# Patient Record
Sex: FEMALE | Race: WHITE | NOT HISPANIC OR LATINO | Employment: FULL TIME | URBAN - METROPOLITAN AREA
[De-identification: names, ages, dates, MRNs, and addresses within clinical notes are randomized per-mention and may not be internally consistent; named-entity substitution may affect disease eponyms.]

---

## 2022-04-12 ENCOUNTER — OFFICE VISIT (OUTPATIENT)
Dept: BARIATRICS | Facility: CLINIC | Age: 47
End: 2022-04-12

## 2022-04-12 VITALS
WEIGHT: 248.7 LBS | DIASTOLIC BLOOD PRESSURE: 70 MMHG | OXYGEN SATURATION: 98 % | HEIGHT: 60 IN | TEMPERATURE: 97.8 F | BODY MASS INDEX: 48.83 KG/M2 | HEART RATE: 76 BPM | SYSTOLIC BLOOD PRESSURE: 130 MMHG

## 2022-04-12 DIAGNOSIS — E78.5 HYPERLIPEMIA: ICD-10-CM

## 2022-04-12 DIAGNOSIS — E66.01 OBESITY, CLASS III, BMI 40-49.9 (MORBID OBESITY) (HCC): ICD-10-CM

## 2022-04-12 DIAGNOSIS — D64.9 ANEMIA: ICD-10-CM

## 2022-04-12 DIAGNOSIS — I10 ESSENTIAL HYPERTENSION: ICD-10-CM

## 2022-04-12 DIAGNOSIS — E66.01 MORBID OBESITY (HCC): Primary | ICD-10-CM

## 2022-04-12 DIAGNOSIS — Z01.818 PRE-OPERATIVE CLEARANCE: Primary | ICD-10-CM

## 2022-04-12 DIAGNOSIS — E66.9 DIABETES MELLITUS TYPE 2 IN OBESE (HCC): ICD-10-CM

## 2022-04-12 DIAGNOSIS — E11.69 DIABETES MELLITUS TYPE 2 IN OBESE (HCC): ICD-10-CM

## 2022-04-12 PROCEDURE — RECHECK

## 2022-04-12 RX ORDER — BUDESONIDE 90 UG/1
1 AEROSOL, POWDER RESPIRATORY (INHALATION) 2 TIMES DAILY
COMMUNITY

## 2022-04-12 RX ORDER — RIZATRIPTAN BENZOATE 5 MG/1
5 TABLET ORAL ONCE AS NEEDED
COMMUNITY

## 2022-04-12 RX ORDER — LISINOPRIL 20 MG/1
1 TABLET ORAL DAILY
COMMUNITY
Start: 2021-12-13

## 2022-04-12 RX ORDER — TOPIRAMATE 25 MG/1
TABLET ORAL
COMMUNITY
Start: 2021-11-30

## 2022-04-12 RX ORDER — FENOFIBRATE 134 MG/1
134 CAPSULE ORAL
COMMUNITY
Start: 2021-05-12 | End: 2022-06-24

## 2022-04-12 RX ORDER — ALBUTEROL SULFATE 90 UG/1
AEROSOL, METERED RESPIRATORY (INHALATION)
COMMUNITY
Start: 2022-03-01

## 2022-04-12 RX ORDER — HYDROCHLOROTHIAZIDE 25 MG/1
1 TABLET ORAL DAILY
COMMUNITY
Start: 2022-03-11

## 2022-04-12 RX ORDER — OMEPRAZOLE 20 MG/1
20 TABLET, DELAYED RELEASE ORAL DAILY
COMMUNITY
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
## 2022-04-12 NOTE — PROGRESS NOTES
Bariatric Nutrition Assessment Note    Type of surgery    Preop  Surgery Date: TBD- 6 month program requirement     Surgeon: Dr Kwabena Garcia  51 y o   female     Wt with BMI of 25: 127 3#  Pre-Op Excess Wt: 121 4#  Ht 5' (1 524 m)   Wt 113 kg (248 lb 11 2 oz)   BMI 48 57 kg/m²     [place]- [facility]  Jeor Equation:  1685 kcal per day   Estimated calories for weight loss 0628-8524 kcal per day  ( 1-2# per wk wt loss - sedentary )  Estimated protein needs 58-69 grams per day (1 0-1 2 gms/kg IBW )   Estimated fluid needs 68 ounces (35 ml/kg IBW )      Weight History   Onset of Obesity: Adult- after birth of her daughter 29 years ago   Family history of obesity: Yes  Wt Loss Attempts: Counseling with  MD  High Protein/Low CHO diets (Atkins, Union, etc )  Medication prescribed for wt loss - topamax,, and intermittent fasting   Dietitian for wt loss and diabetes   Patient has tried the above for 6 months or more with insufficient weight loss or weight regain, which is why patient has requested to be evaluated for weight loss surgery today  Maximum Wt Lost: 20# with medication and high protein       Review of History and Medications   No past medical history on file  No past surgical history on file    Social History     Socioeconomic History    Marital status:      Spouse name: Not on file    Number of children: Not on file    Years of education: Not on file    Highest education level: Not on file   Occupational History    Not on file   Tobacco Use    Smoking status: Not on file    Smokeless tobacco: Not on file   Substance and Sexual Activity    Alcohol use: Not on file    Drug use: Not on file    Sexual activity: Not on file   Other Topics Concern    Not on file   Social History Narrative    Not on file     Social Determinants of Health     Financial Resource Strain: Not on file   Food Insecurity: Not on file   Transportation Needs: Not on file   Physical Activity: Not on file   Stress: Not on file   Social Connections: Not on file   Intimate Partner Violence: Not on file   Housing Stability: Not on file     No current outpatient medications on file  Food Intake and Lifestyle Assessment   Food Intake Assessment completed via usual diet recall  Works from home 8 to 4 pm   Breakfast: 10 to 12 noon   Either home made turkey dover egg and bagel   OR- hummus and falafel and baba ganeshe with dover and egg   Snack: 0   Lunch: 0  Snack: low fat cheese stick or pre cut cheese   Dinner: 5 to 6 pm - cooks dinner for her father who she cares for   Generally will make pasta with meatsauce or frozen beef meatballs  Tacos, pasta fagioli ( vegetarian ) , chicken sausage ( italian with zuccini broccli advocado oil )  Thursday night take out - pizza, Javier or [employer]: 7:30 pm - cheetos, or doritos or pistachios or popcorn ( blue box microwave )   Beverage intake: water, sugar free beverages and turkey [place] diet iced tea, powerade or gatorade zero   32 ounces Jose insulated mugs with 6 ice cubes   Occasional diet soda   Protein supplement: none currently - dislikes anything "milky" in texture   Estimated protein intake per day: 40-50 grams   Estimated fluid intake per day: > 64 ounces of fluid per day   Meals eaten away from home: once per week on Thursdays   Typical meal pattern: 2 meals per day and 2-3 snacks per day  Eating Behaviors: Frequent snacking/ grazing, Mindless eating and Craves salty foods  Food allergies or intolerances: Not on File   Allergic to bananas and jason seeds   Cultural or Yazidi considerations: , avoids pork ,     Physical Assessment  Physical Activity  Types of exercise:  Yoga  Stopped during pandemic   Modified yoga at home   Current physical limitations: osteoarthritis in both knees but had to stop hiking due to accident       Psychosocial Assessment   Support systems: friend(s)  Socioeconomic factors: lives with  2 sons - 12 and 25, and caretaker for father,   Works full time from home Adult daughter living outside of the home     Nutrition Diagnosis  Diagnosis: Overweight / Obesity (NC-3 3)  Related to: Physical inactivity and Excessive energy intake  As Evidenced by: BMI >25 and Unintentional weight gain     Nutrition Prescription: Recommend the following diet  1500 kcal per day/ 75 grams protein/ 170 grams carb/ 58 grams of fat and 15 grams of fiber   64-70 ounces of calorie free beverages per day     Interventions and Teaching   Discussed pre-op and post-op nutrition guidelines  Patient educated and handouts provided    Surgical changes to stomach / GI  Capacity of post-surgery stomach  Diet progression  Adequate hydration  Sugar and fat restriction to decrease "dumping syndrome"  Fat restriction to decrease steatorrhea  Expected weight loss  Weight loss plateaus/ possibility of weight regain  Exercise  Suggestions for pre-op diet  Nutrition considerations after surgery  Protein supplements  Appropriate carbohydrate, protein, and fat intake, and food/fluid choices to maximize safe weight loss, nutrient intake, and tolerance   Dietary and lifestyle changes  Possible problems with poor eating habits  Intuitive eating  Techniques for self monitoring and keeping daily food journal  Potential for food intolerance after surgery, and ways to deal with them including: lactose intolerance, nausea, reflux, vomiting, diarrhea, food intolerance, appetite changes, gas  Vitamin / Mineral supplementation of Multivitamin with minerals, Calcium, Vitamin B12, Iron, Fat Soluble vitamins and Vitamin D    Patient is not currently pregnant and doesn't desire to become pregnant a minimum of one year post-op    Education provided to: patient    Barriers to learning: No barriers identified    Readiness to change: preparation    Prior research on procedure: pre-op class and friends or family    Comprehension: needs reinforcement, verbalizes understanding and due to long term Covid side effects, has some short term memory issues      Expected Compliance: good  Recommendations  Pt is an appropriate candidate for surgery   Yes    Evaluation / Monitoring  Dietitian to Monitor: Eating pattern as discussed Tolerance of nutrition prescription Body weight Lab values Physical activity Bowel pattern    Goals  Complete lession plans 1-6, Eat 3 meals per day and Eliminate mindless snacking   Recommend 2000 IU D3 per day, multivitamin and mineral and iron per day   Meal plan  B- 10 am   S- 2-3 pm - cheese stick  D- 5 pm - protein, veggies and 1/2 cup starch  S- planned snack- handout provided   Continue with yoga        Time Spent:   1 Hour

## 2022-05-20 ENCOUNTER — OFFICE VISIT (OUTPATIENT)
Dept: BARIATRICS | Facility: CLINIC | Age: 47
End: 2022-05-20

## 2022-05-20 VITALS — WEIGHT: 249.4 LBS | BODY MASS INDEX: 48.97 KG/M2 | HEIGHT: 60 IN

## 2022-05-20 DIAGNOSIS — E66.01 OBESITY, CLASS III, BMI 40-49.9 (MORBID OBESITY) (HCC): Primary | ICD-10-CM

## 2022-05-20 PROCEDURE — RECHECK: Performed by: DIETITIAN, REGISTERED

## 2022-05-20 NOTE — PATIENT INSTRUCTIONS
Goals  Attend one support group  Complete lesson plan 2 - keep food log, read food labels  Continue to eat off a smaller plate 8-9 inches  Modified yoga daily   Eat 3 meals and one planned snack per day
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
## 2022-06-24 ENCOUNTER — OFFICE VISIT (OUTPATIENT)
Dept: BARIATRICS | Facility: CLINIC | Age: 47
End: 2022-06-24
Payer: COMMERCIAL

## 2022-06-24 VITALS
HEIGHT: 60 IN | HEART RATE: 87 BPM | WEIGHT: 243.5 LBS | BODY MASS INDEX: 47.81 KG/M2 | DIASTOLIC BLOOD PRESSURE: 78 MMHG | TEMPERATURE: 99.1 F | SYSTOLIC BLOOD PRESSURE: 118 MMHG

## 2022-06-24 DIAGNOSIS — G89.29 CHRONIC PAIN OF BOTH KNEES: ICD-10-CM

## 2022-06-24 DIAGNOSIS — E78.2 MIXED HYPERLIPIDEMIA: ICD-10-CM

## 2022-06-24 DIAGNOSIS — I10 ESSENTIAL HYPERTENSION: ICD-10-CM

## 2022-06-24 DIAGNOSIS — G43.009 MIGRAINE WITHOUT AURA AND WITHOUT STATUS MIGRAINOSUS, NOT INTRACTABLE: ICD-10-CM

## 2022-06-24 DIAGNOSIS — M17.0 OSTEOARTHRITIS OF BOTH KNEES: ICD-10-CM

## 2022-06-24 DIAGNOSIS — E11.9 TYPE 2 DIABETES MELLITUS WITHOUT COMPLICATION, WITHOUT LONG-TERM CURRENT USE OF INSULIN (HCC): ICD-10-CM

## 2022-06-24 DIAGNOSIS — M25.562 CHRONIC PAIN OF BOTH KNEES: ICD-10-CM

## 2022-06-24 DIAGNOSIS — M25.561 CHRONIC PAIN OF BOTH KNEES: ICD-10-CM

## 2022-06-24 DIAGNOSIS — E66.01 OBESITY, CLASS III, BMI 40-49.9 (MORBID OBESITY) (HCC): Primary | ICD-10-CM

## 2022-06-24 PROBLEM — M19.90 ARTHRITIS: Status: ACTIVE | Noted: 2019-08-06

## 2022-06-24 PROBLEM — E66.813 OBESITY, CLASS III, BMI 40-49.9 (MORBID OBESITY): Status: ACTIVE | Noted: 2022-06-24

## 2022-06-24 PROCEDURE — 99204 OFFICE O/P NEW MOD 45 MIN: CPT | Performed by: SURGERY

## 2022-06-24 RX ORDER — METFORMIN HYDROCHLORIDE 500 MG/1
500 TABLET, EXTENDED RELEASE ORAL
COMMUNITY
Start: 2022-05-04

## 2022-06-24 NOTE — PROGRESS NOTES
BARIATRIC INITIAL CONSULT - BARIATRIC SURGERY    Lance Martinez 49 y o  female MRN: [id number]  Unit/Bed#:  Encounter: [id number]      HPI:  Lance Martinez is a 49 y o  female who presents with a longstanding history of morbid obesity and inability to sustain a meaningful weight loss  Here today to discuss bariatric options  Visit type: initial visit    Symptoms: excess weight and inability to loss weight    Associated Symptoms: depressed mood and anxiety    Associated Conditions: hyperlipidemia, abdominal obesity and hypergycemia  Disease Complications: diabetes, hypertension, osteoarthritis and Migraine  Weight Loss Interest: high  Previous Diet Trials: low calorie     Exercise Frequency:infrequency  Types of Exercise: walking        Review of Systems   Gastrointestinal:        Intermittent heartburn   All other systems reviewed and are negative  Historical Information   Past Medical History:   Diagnosis Date    Diabetes mellitus ([facility] [address] )     Hypercholesteremia     Hypertension     Migraines      History reviewed  No pertinent surgical history  Social History   Social History     Substance and Sexual Activity   Alcohol Use Yes    Comment: socially/rare     Social History     Substance and Sexual Activity   Drug Use Never     Social History     Tobacco Use   Smoking Status Never Smoker   Smokeless Tobacco Never Used     Family History: non-contributory    Meds/Allergies   all medications and allergies reviewed  Allergies   Allergen Reactions    Simvastatin Other (See Comments)     Other reaction(s): MUSCLE SPASMS    Latex Rash       Objective       Current Vitals:   Blood Pressure: 118/78 (06/24/22 1431)  Pulse: 87 (06/24/22 1431)  Temperature: 99 1 °F (37 3 °C) (06/24/22 1431)  Temp Source: Tympanic (06/24/22 1431)  Height: 5' (152 4 cm) (06/24/22 1431)  Weight - Scale: 110 kg (243 lb 8 oz) (06/24/22 1431)        Invasive Devices  Report    None                 Physical Exam  Vitals reviewed  Constitutional:       General: She is not in acute distress  Appearance: She is well-developed  She is not diaphoretic  HENT:      Head: Normocephalic and atraumatic  Right Ear: External ear normal       Left Ear: External ear normal       Nose: Nose normal    Eyes:      General: No scleral icterus  Right eye: No discharge  Left eye: No discharge  Conjunctiva/sclera: Conjunctivae normal    Neurological:      Mental Status: She is alert and oriented to person, place, and time  Psychiatric:         Behavior: Behavior normal          Thought Content: Thought content normal          Judgment: Judgment normal          Lab Results: I have personally reviewed pertinent lab results  Imaging: I have personally reviewed pertinent reports  EKG, Pathology, and Other Studies: I have personally reviewed pertinent reports  Assessment/PLAN:    49 y o  female with a long standing h/o of obesity and inability to sustain any meaningful weight loss on her own despite several attempts  She is interested in the Laparoscopic sleeve gastrectomy  We have discussed the 14%-20% incidence of post op heartburn after the sleeve gastrectomy and the fact that if this cannot be controlled with medication or conservative measures it might need to be converted to a Gisselle-en-Y gastric bypass  We have also discussed the fact that the patient needs to be followed up postoperatively and potentially get screening endoscopies in the future to rule out any development of pathology as a result of the sleeve gastrectomy  As a part of her pre op evaluation, she will be referred to a cardiologist and for a sleep evaluation and consult  She needs an EGD to evaluate the anatomy of her GI tract prior to the operation  I have spent over 30 minutes with her face to face in the office today discussing her options and details of the surgery   We have seen an animation of the surgery on the computer that illustrates how the operation is done and how the anatomy will be altered with the procedure  Over 50% of this was coordinating care  I have used the [facility] bariatric surgical risk/benefit calculator and we have discussed the results as part of the preop education  She was given the opportunity to ask questions and I have answered all of them  I have discussed and educated the patient with regards to the components of our multidisciplinary program and the importance of compliance and follow up in the post operative period  The patient was also instructed with regards to the importance of behavior modification, nutritional counseling, support meeting attendance and lifestyle changes that are important to ensure success  Although there is a great statistical chance of improvement or even resolution of most of her associated comorbidities, the results vary from patient to patient and they largely depend on her commitment and compliance  She needs to lose  12 lbs prior to the operation        Nicci Looney MD  6/24/2022  2:41 PM

## 2022-07-21 ENCOUNTER — OFFICE VISIT (OUTPATIENT)
Dept: BARIATRICS | Facility: CLINIC | Age: 47
End: 2022-07-21

## 2022-07-21 DIAGNOSIS — E66.01 OBESITY, CLASS III, BMI 40-49.9 (MORBID OBESITY) (HCC): Primary | ICD-10-CM

## 2022-07-21 PROCEDURE — RECHECK

## 2022-08-17 ENCOUNTER — OFFICE VISIT (OUTPATIENT)
Dept: BARIATRICS | Facility: CLINIC | Age: 47
End: 2022-08-17

## 2022-08-17 VITALS — BODY MASS INDEX: 47.91 KG/M2 | HEIGHT: 60 IN | WEIGHT: 244 LBS

## 2022-08-17 DIAGNOSIS — E66.01 OBESITY, CLASS III, BMI 40-49.9 (MORBID OBESITY) (HCC): Primary | ICD-10-CM

## 2022-08-17 PROCEDURE — RECHECK: Performed by: DIETITIAN, REGISTERED

## 2022-08-17 NOTE — PROGRESS NOTES
Bariatric Nutrition Assessment Note    Type of surgery    Preop  Surgery Date: TBD- 6 month program requirement     Today is 4/6 of program requirement     Surgeon: Dr Doug Santoyo  49 y o   female     Wt with BMI of 25: 127 3#  Pre-Op Excess Wt: 121 4#  Ht 5' (1 524 m)   Wt 111 kg (244 lb)   BMI 47 65 kg/m²   Pt has gained two pounds since last appointment  Wt Readings from Last 3 Encounters:   06/24/22 110 kg (243 lb 8 oz)   05/20/22 113 kg (249 lb 6 4 oz)   04/12/22 113 kg (248 lb 11 2 oz)       [place] Equation:  1685 kcal per day   Estimated calories for weight loss 0399-3747 kcal per day  ( 1-2# per wk wt loss - sedentary )  Estimated protein needs 58-69 grams per day (1 0-1 2 gms/kg IBW )   Estimated fluid needs 68 ounces (35 ml/kg IBW )      Weight History   Onset of Obesity: Adult- after birth of her daughter 29 years ago   Family history of obesity: Yes  Wt Loss Attempts: Counseling with  MD  High Protein/Low CHO diets (Atkins, [place], etc )  Medication prescribed for wt loss - topamax,, and intermittent fasting   Dietitian for wt loss and diabetes   Patient has tried the above for 6 months or more with insufficient weight loss or weight regain, which is why patient has requested to be evaluated for weight loss surgery today  Maximum Wt Lost: 20# with medication and high protein       Review of History and Medications   Past Medical History:   Diagnosis Date    [facility] mellitus ([facility] [address] )     Hypercholesteremia     Hypertension     Migraines      No past surgical history on file    Social History     Socioeconomic History    Marital status:      Spouse name: Not on file    Number of children: Not on file    Years of education: Not on file    Highest education level: Not on file   Occupational History    Not on file   Tobacco Use    Smoking status: Never Smoker    Smokeless tobacco: Never Used   Vaping Use    Vaping Use: Never used Substance and Sexual Activity    Alcohol use: Yes     Comment: socially/rare    Drug use: Never    Sexual activity: Not on file   Other Topics Concern    Not on file   Social History Narrative    Not on file     Social Determinants of Health     Financial Resource Strain: Not on file   Food Insecurity: Not on file   Transportation Needs: Not on file   Physical Activity: Not on file   Stress: Not on file   Social Connections: Not on file   Intimate Partner Violence: Not on file   Housing Stability: Not on file       Current Outpatient Medications:     albuterol (PROVENTIL HFA,VENTOLIN HFA) 90 mcg/act inhaler, TAKE 2 PUFFS BY MOUTH EVERY 6 HOURS AS NEEDED FOR WHEEZE, Disp: , Rfl:     budesonide (Pulmicort Flexhaler) 90 MCG/ACT inhaler, Inhale 1 puff 2 (two) times a day Rinse mouth after use , Disp: , Rfl:     fenofibrate micronized (LOFIBRA) 134 MG capsule, Take 134 mg by mouth, Disp: , Rfl:     hydrochlorothiazide (HYDRODIURIL) 25 mg tablet, Take 1 tablet by mouth daily, Disp: , Rfl:     lisinopril (ZESTRIL) 20 mg tablet, Take 1 tablet by mouth daily, Disp: , Rfl:     metFORMIN (GLUCOPHAGE) 500 mg tablet, Take 500 mg by mouth in the morning, Disp: , Rfl:     metFORMIN (GLUCOPHAGE-XR) 500 mg 24 hr tablet, Take 500 mg by mouth daily with breakfast, Disp: , Rfl:     Multiple Vitamin (MULTIVITAMIN ADULT PO), Take 1 tablet by mouth daily, Disp: , Rfl:     omeprazole (PriLOSEC OTC) 20 MG tablet, Take 20 mg by mouth daily, Disp: , Rfl:     rizatriptan (MAXALT) 5 MG tablet, Take 5 mg by mouth once as needed for migraine May repeat in 2 hours if needed, Disp: , Rfl:     topiramate (TOPAMAX) 25 mg tablet, Take one to three pills by mouth in the evening as directed w plenty of water, Disp: , Rfl:      Food Intake and Lifestyle Assessment   Food Intake Assessment completed via usual diet recall  Works from home 8 to 4 pm   Breakfast: 10 to 12 noon   Either home made turkey dover egg and bagel   OR- hummus and falafel and baba ganeshe with dover and egg   Snack: 0   Lunch: 0  Snack: low fat cheese stick or pre cut cheese   Dinner: 5 to 6 pm - cooks dinner for her father who she cares for   Generally will make pasta with meatsauce or frozen beef meatballs  Tacos, pasta fagioli ( vegetarian ) , chicken sausage ( italian with zuccini broccli advocado oil )  Thursday night take out - pizza, Javier or [employer]: 7:30 pm - cheetos, or doritos or pistachios or popcorn ( blue box microwave )   Beverage intake: water, sugar free beverages and turkey [place] diet iced tea, powerade or gatorade zero   32 ounces Jose insulated mugs with 6 ice cubes   Occasional diet soda   Protein supplement: none currently - dislikes anything "milky" in texture   Estimated protein intake per day: 40-50 grams   Estimated fluid intake per day: > 64 ounces of fluid per day   Meals eaten away from home: once per week on Thursdays   Typical meal pattern: 2 meals per day and 2-3 snacks per day  Eating Behaviors: Frequent snacking/ grazing, Mindless eating and Craves salty foods  Food allergies or intolerances: Allergies   Allergen Reactions    Simvastatin Other (See Comments)     Other reaction(s): MUSCLE SPASMS    Latex Rash      Allergic to bananas and jason seeds   Cultural or Latter day considerations: , avoids pork ,     Physical Assessment  Physical Activity  Types of exercise:  Yoga and swimming  Stopped during pandemic   Modified yoga at home   Current physical limitations: osteoarthritis in both knees but had to stop hiking due to accident       Psychosocial Assessment   Support systems: friend(s)  Socioeconomic factors: lives with  2 sons - 12 and 25, and caretaker for father,   Works full time from home Adult daughter living outside of the home     Nutrition Diagnosis- continued   Diagnosis: Overweight / Obesity (NC-3 3)  Related to: Physical inactivity and Excessive energy intake  As Evidenced by: BMI >25 and Unintentional weight gain     Nutrition Prescription: Recommend the following diet  1500 kcal per day/ 75 grams protein/ 170 grams carb/ 58 grams of fat and 15 grams of fiber   64-70 ounces of calorie free beverages per day     Interventions and Teaching   Discussed pre-op and post-op nutrition guidelines  Patient educated and handouts provided    Surgical changes to stomach / GI  Capacity of post-surgery stomach  Diet progression  Adequate hydration  Sugar and fat restriction to decrease "dumping syndrome"  Fat restriction to decrease steatorrhea  Expected weight loss  Weight loss plateaus/ possibility of weight regain  Exercise  Suggestions for pre-op diet  Nutrition considerations after surgery  Protein supplements  Appropriate carbohydrate, protein, and fat intake, and food/fluid choices to maximize safe weight loss, nutrient intake, and tolerance   Dietary and lifestyle changes  Possible problems with poor eating habits  Intuitive eating  Techniques for self monitoring and keeping daily food journal  Potential for food intolerance after surgery, and ways to deal with them including: lactose intolerance, nausea, reflux, vomiting, diarrhea, food intolerance, appetite changes, gas  Vitamin / Mineral supplementation of Multivitamin with minerals, Calcium, Vitamin B12, Iron, Fat Soluble vitamins and Vitamin D    Patient is not currently pregnant and doesn't desire to become pregnant a minimum of one year post-op    Education provided to: patient    Barriers to learning: No barriers identified    Readiness to change: preparation    Prior research on procedure: pre-op class and friends or family    Comprehension: needs reinforcement, verbalizes understanding and due to long term Covid side effects, has some short term memory issues      Expected Compliance: good    Workflow:   Psych and/or D+A Clearance: N/A   PCP Letter: [facility]- won't write letter until month before surgery    Support Group: no   Surgeon Appt June 24th 2022   EGD needs surgeon appointment    Cardiac Risk Assessment referral placed    Sleep Studies N/A   Blood work completed by PCP    Nicotine test N/A   6 Month Pre-Operative Program: 1/6   Weight Loss goal 236#      Recommendations  Pt is an appropriate candidate for surgery  Yes    Evaluation / Monitoring  Pt forgot book and expressed that she learned the material from another program, so does not need to do it again  Pt is eating two meals a day with one snack on some days  Pt gets physical activity most days through modified yoga, going to the pool, or walking  Pt went to a support group in June, and seemed to like it/made a recipe from it  Goals- continued   Complete lession plans 1-6, Eat 3 meals per day and Eliminate mindless snacking  Meal plan  B- 10 am   S- 1-2 pm - cheese stick  D- 6 pm - protein, veggies and 1/2 cup starch  S- planned snack- handout provided - skips when she eats dinner later   Increase activity by continuing completing two youtube workout videos per week  Improve carbohydrate quality by increasing whole grains and substituting bean/vegetable noodles for white        Time Spent:   30 minutes

## 2022-08-17 NOTE — PATIENT INSTRUCTIONS
Goals  Decrease processed carbohydrates  Increase vegetables    Continue with 2 meals and one snack   Try 2 you tube videos per week or one video and one walk

## 2022-08-30 ENCOUNTER — PREP FOR PROCEDURE (OUTPATIENT)
Dept: BARIATRICS | Facility: CLINIC | Age: 47
End: 2022-08-30

## 2022-08-30 DIAGNOSIS — E66.01 OBESITY, CLASS III, BMI 40-49.9 (MORBID OBESITY) (HCC): Primary | ICD-10-CM

## 2022-09-14 NOTE — PROGRESS NOTES
Name           Niles Harding      5/6 weight check  Starting weight 248 7  Last time weight 244  Today's weight 239 2      Surgery month:  Nov/Dec  Surgery deadline: March  Surgeon: Dr Ayaka Dan Follow Up Note:    Preop  Surgery Date: TBD- 6 month program requirement     Mental health and wellness - Patient presents to the office today  For support visit and weight check  The patient explained she did the MWM with [facility] and is able to remember how much food intake to consume, but will be buying the measuring cups  Continues to read the bariatric book because she feels it is very insightful and would be trying to link with FB because she would like to increase her support network  Eating behaviors - two meals a day  Sometimes snacks last meal at 6:00 pm  working on 30/60 and not journaling or tracking, but shared she is using a smaller plate, a teaspoon and not having seconds  Activity -  Chair yoga daily  Has some difficulty walking because of pain,  but she is swimming this weekend two days  Progress toward program requirements    Workflow:  Psych and/or D+A Clearance: N/A  PCP Letter:Pt shared that she was going to talk to her PCP to explain its not to clear her  Support Group: 6/8/22  Surgeon Appt  June 24 th 2022  EGD appointment scheduled for 10  5 22   Cardiac Risk Assessment referral placed pt shared scheduled 10 3  22  Sleep Studies N/A  Blood work completed by PCP   Nicotine test N/A     Reviewed and discussed  Adequate hydration  Exercise  Meal planning and preparation  Lifestyle changes  Techniques for self monitoring and keeping daily food journal    Goal:1- getting back to walking and enrique  Next appointment: 10/13/22 JOSÉ MIGUEL

## 2022-09-15 ENCOUNTER — OFFICE VISIT (OUTPATIENT)
Dept: BARIATRICS | Facility: CLINIC | Age: 47
End: 2022-09-15

## 2022-09-15 VITALS — BODY MASS INDEX: 46.72 KG/M2 | WEIGHT: 239.2 LBS

## 2022-09-15 DIAGNOSIS — E66.01 OBESITY, CLASS III, BMI 40-49.9 (MORBID OBESITY) (HCC): Primary | ICD-10-CM

## 2022-09-15 PROCEDURE — RECHECK

## 2022-09-26 ENCOUNTER — TELEPHONE (OUTPATIENT)
Dept: BARIATRICS | Facility: CLINIC | Age: 47
End: 2022-09-26

## 2022-09-26 NOTE — TELEPHONE ENCOUNTER
Called and confirmed EGD scheduled Oct 5 Problem: OXYGENATION/RESPIRATORY FUNCTION  Goal: Patient will maintain patent airway  5/18/2021 2220 by Saad Gongora RN  Outcome: Ongoing  Note:   Patient's EF (Ejection Fraction) is less than 40%    Heart Failure Medications:  Diuretics[de-identified] Furosemide    (One of the following REQUIRED for EF <40%/SYSTOLIC FAILURE but MAY be used in EF% >40%/DIASTOLIC FAILURE)        ACE[de-identified] Lisinopril        ARB[de-identified] None         ARNI[de-identified] None    (Beta Blockers)  NON- Evidenced Based Beta Blocker (for EF% >40%/DIASTOLIC FAILURE): Metoprolol TARTrate- Lopressor    Evidenced Based Beta Blocker::(REQUIRED for EF% <40%/SYSTOLIC FAILURE) None  . .................................................................................................................................................. Patient's weights and intake/output reviewed: Yes    Patient's Last Weight: 259 lbs obtained by standing scale. Difference of 6 lbs less than last documented weight. Intake/Output Summary (Last 24 hours) at 5/18/2021 2220  Last data filed at 5/18/2021 2053  Gross per 24 hour   Intake 1210 ml   Output 3425 ml   Net -2215 ml       Comorbidities Reviewed Yes    Patient has a past medical history of Diverticulitis, Hypertension, and Kidney stone. >>For CHF and Comorbidity documentation on Education Time and Topics, please see Education Tab    Progressive Mobility Assessment:  What is this patient's Current Level of Mobility?: Ambulatory-Up Ad Aminta  How was this patient Mobilized today?: Edge of Bed, Up to Chair, Bedside Commode,  Up to Toilet/Shower, and Up in Room                 With Whom? Self                 Level of Difficulty/Assistance: Independent     Pt resting in bed at this time on room air. Pt denies shortness of breath. Pt with pitting lower extremity edema.      Patient and/or Family's stated Goal of Care this Admission: reduce shortness of breath, increase activity tolerance, better understand heart failure and disease management, be more

## 2022-10-03 ENCOUNTER — OFFICE VISIT (OUTPATIENT)
Dept: CARDIOLOGY CLINIC | Facility: CLINIC | Age: 47
End: 2022-10-03
Payer: COMMERCIAL

## 2022-10-03 VITALS
OXYGEN SATURATION: 98 % | HEIGHT: 60 IN | DIASTOLIC BLOOD PRESSURE: 86 MMHG | HEART RATE: 79 BPM | BODY MASS INDEX: 48.18 KG/M2 | WEIGHT: 245.4 LBS | SYSTOLIC BLOOD PRESSURE: 122 MMHG

## 2022-10-03 DIAGNOSIS — E66.01 MORBID OBESITY (HCC): ICD-10-CM

## 2022-10-03 DIAGNOSIS — Z01.810 PRE-OPERATIVE CARDIOVASCULAR EXAMINATION: ICD-10-CM

## 2022-10-03 DIAGNOSIS — E78.2 MIXED HYPERLIPIDEMIA: ICD-10-CM

## 2022-10-03 DIAGNOSIS — I10 ESSENTIAL HYPERTENSION: ICD-10-CM

## 2022-10-03 DIAGNOSIS — E11.9 TYPE 2 DIABETES MELLITUS WITHOUT COMPLICATION, WITHOUT LONG-TERM CURRENT USE OF INSULIN (HCC): ICD-10-CM

## 2022-10-03 DIAGNOSIS — E66.01 OBESITY, CLASS III, BMI 40-49.9 (MORBID OBESITY) (HCC): Primary | ICD-10-CM

## 2022-10-03 PROCEDURE — 99204 OFFICE O/P NEW MOD 45 MIN: CPT | Performed by: INTERNAL MEDICINE

## 2022-10-03 PROCEDURE — 93000 ELECTROCARDIOGRAM COMPLETE: CPT | Performed by: INTERNAL MEDICINE

## 2022-10-03 RX ORDER — SODIUM CHLORIDE 9 MG/ML
125 INJECTION, SOLUTION INTRAVENOUS CONTINUOUS
Status: CANCELLED | OUTPATIENT
Start: 2022-10-03

## 2022-10-03 RX ORDER — FENOFIBRATE 200 MG/1
200 CAPSULE ORAL DAILY
COMMUNITY
Start: 2022-08-02

## 2022-10-03 NOTE — PROGRESS NOTES
[facility]    CHIEF COMPLAINT:   Chief Complaint   Patient presents with    New Patient Visit     Cardiac Clearance for bariatric surgery (no set date yet)- No previous Cardiologist        HPI:  Alejandro Singh is a 49 y o  female with a past medical history of morbid obesity, hypertension, hyperlipidemia, diabetes without insulin use who presents today for per operative risk assessment for bariatric surgery  She is following with the weight management center  Her surgery will tentatively be this December/January  She denies any history of TIA/CVA, MI, CHF  Her renal function is normal  Her diabetes is treated with metformin  She has tried simvastatin in the past and had severe myalgias  No history of sleep apnea  She denies any fatigue, new visual changes, lightheadedness, syncope, chest pain, palpitations, shortness of breath at rest or with exertion, orthopnea, PND, nausea, vomiting, diarrhea, dark or bright red blood in stools, lower extremity swelling  Activity level: does modified yoga and is limited due to bilateral knee osteoarthritis    Social history: Non smoker  Rare alcohol  No illicit drugs  Family history: Father had MI around age [address]  Mother had kidney cancer and CHF - dead at 66  Brother without cardiac problems  Daughter was born with coronary artery fistula  The following portions of the patient's history were reviewed and updated as appropriate: allergies, current medications, past family history, past medical history, past social history, past surgical history, and problem list     SINCE LAST OV I REVIEWED WITH THE PATIENT THE INTERIM LABS, TEST RESULTS, CONSULTANT(S) NOTES AND PERFORMED AN INTERIM REVIEW OF HISTORY    Past Medical History:   Diagnosis Date    Diabetes mellitus ([address] )     Hypercholesteremia     Hypertension     Migraines        History reviewed  No pertinent surgical history      Social History     Socioeconomic History    Marital status:  Spouse name: Not on file    Number of children: Not on file    Years of education: Not on file    Highest education level: Not on file   Occupational History    Not on file   Tobacco Use    Smoking status: Never Smoker    Smokeless tobacco: Never Used   Vaping Use    Vaping Use: Never used   Substance and Sexual Activity    Alcohol use: Yes     Comment: socially/rare    Drug use: Never    Sexual activity: Not on file   Other Topics Concern    Not on file   Social History Narrative    Not on file     Social Determinants of Health     Financial Resource Strain: Not on file   Food Insecurity: Not on file   Transportation Needs: Not on file   Physical Activity: Not on file   Stress: Not on file   Social Connections: Not on file   Intimate Partner Violence: Not on file   Housing Stability: Not on file       Family History   Problem Relation Age of Onset    Cancer Mother     Heart failure Mother     Diabetes Mother     Atrial fibrillation Mother     Hypertension Mother     Heart attack Father     Stroke Father     Hypertension Father     Kidney disease Father     Diabetes Father        Allergies   Allergen Reactions    Simvastatin Other (See Comments)     Other reaction(s): MUSCLE SPASMS    Latex Rash       Current Outpatient Medications   Medication Sig Dispense Refill    albuterol (PROVENTIL HFA,VENTOLIN HFA) 90 mcg/act inhaler TAKE 2 PUFFS BY MOUTH EVERY 6 HOURS AS NEEDED FOR WHEEZE      budesonide (Pulmicort Flexhaler) 90 MCG/ACT inhaler Inhale 1 puff 2 (two) times a day Rinse mouth after use        fenofibrate micronized (LOFIBRA) 200 MG capsule Take 200 mg by mouth daily      hydrochlorothiazide (HYDRODIURIL) 25 mg tablet Take 1 tablet by mouth daily      lisinopril (ZESTRIL) 20 mg tablet Take 1 tablet by mouth daily      metFORMIN (GLUCOPHAGE-XR) 500 mg 24 hr tablet Take 500 mg by mouth daily with breakfast      Multiple Vitamin (MULTIVITAMIN ADULT PO) Take 1 tablet by mouth daily  omeprazole (PriLOSEC OTC) 20 MG tablet Take 20 mg by mouth daily      rizatriptan (MAXALT) 5 MG tablet Take 5 mg by mouth once as needed for migraine May repeat in 2 hours if needed      topiramate (TOPAMAX) 25 mg tablet Take one to three pills by mouth in the evening as directed w plenty of water      fenofibrate micronized (LOFIBRA) 134 MG capsule Take 134 mg by mouth (Patient not taking: Reported on 10/3/2022)      metFORMIN (GLUCOPHAGE) 500 mg tablet Take 500 mg by mouth in the morning (Patient not taking: No sig reported)       No current facility-administered medications for this visit  Ht 5' (1 524 m)   Wt 111 kg (245 lb 6 4 oz)   BMI 47 93 kg/m²     Review of Systems   All other systems reviewed and are negative  Physical Exam  Vitals reviewed  Constitutional:       General: She is not in acute distress  Appearance: She is well-developed  She is obese  She is not toxic-appearing  HENT:      Head: Normocephalic and atraumatic  Eyes:      General: No scleral icterus  Conjunctiva/sclera: Conjunctivae normal    Neck:      Vascular: No carotid bruit  Cardiovascular:      Rate and Rhythm: Normal rate and regular rhythm  Heart sounds: Normal heart sounds  No murmur heard  No gallop  Pulmonary:      Effort: Pulmonary effort is normal  No respiratory distress  Breath sounds: Normal breath sounds  No wheezing or rales  Abdominal:      General: Abdomen is flat  Bowel sounds are normal  There is no distension  Palpations: Abdomen is soft  Tenderness: There is no abdominal tenderness  Musculoskeletal:      Right lower leg: No edema  Left lower leg: No edema  Skin:     General: Skin is warm and dry  Coloration: Skin is not jaundiced or pale  Neurological:      Mental Status: She is alert     Psychiatric:         Mood and Affect: Mood normal          Behavior: Behavior normal         No results found for: NA, K, CL, CO2, BUN, CREATININE, GLUCOSE, CALCIUM, ALT, AST, INR    No results found for: CHOL, HDL, LDLCALC, TRIG    No results found for: WBC, HGB, HCT, PLT    Lab Results   Component Value Date     05/25/2019    EAG 7 7 05/25/2019    HGBA1C 6 8 (H) 04/30/2022       Cardiac studies:   Results for orders placed or performed in visit on 10/03/22   POCT ECG    Impression    Normal sinus rhythm  Normal ECG         ASSESSMENT AND PLAN:  JAJA was seen today for new patient visit  Diagnoses and all orders for this visit:  #  Essential hypertension: Blood pressure is well-controlled on current regimen  Continue lisinopril 20 mg daily and HCTZ 25 mg daily  #  Type 2 diabetes mellitus without complication, without long-term current use of insulin ([facility] ): Last hemoglobin A1c 6 8%  she is on metformin 500 mg daily  #  Mixed hyperlipidemia: Lipid panel performed at outside lab reviewed and very abnormal  She is on Fenofibrate  She has tried simvastatin in the past but reports immediately developing significant myalgias  I do recommend a trial of a another statin but she is not open to this currently  #  Obesity, Class III, BMI 40-49 9 (morbid obesity) ([facility] )  #  Morbid obesity ([facility] )  -     Ambulatory referral to Cardiology  -     POCT ECG  #  Pre-operative cardiovascular examination  49year old female with the above mentioned past medical history who presents for pre operative assessment for bariatric surgery  Surgery type and date are to be determined  She is asymptomatic from a cardiovascular perspective  Functional capacity is at least 4 METs and she is primarily limited due to bilateral knee osteoarthritis  ECG is normal  No history of TIA/CVA, MI, CHF  Her renal function is normal  Her diabetes is treated with metformin  Recommendations:  Assuming gastric sleeve vs joyce-en-y, her RCRI risk factors: "high-risk" surgery (intraperitoneal, intrathoracic, or suprainguinal vascular)   RCI RISK CLASS II (1 risk factor, risk of major cardiac compl  appr  1 3%)  No Cardiac Contraindication for Planned Surgical Procedures  She is acceptable risk and no additional cardiac work up recommend at this time prior to the above mentioned procedure      Nathaniel Vogel MD

## 2022-10-03 NOTE — PATIENT INSTRUCTIONS
You were seen today in the Cardiology office for pre operative risk assessment  Please continue your blood pressure medications  No additional cardiac work up is required prior to surgery  Thank you for choosing [address]  Please call our office or use [facility] with any questions

## 2022-10-05 ENCOUNTER — HOSPITAL ENCOUNTER (OUTPATIENT)
Dept: GASTROENTEROLOGY | Facility: HOSPITAL | Age: 47
Setting detail: OUTPATIENT SURGERY
Discharge: HOME | End: 2022-10-05
Attending: SURGERY
Payer: COMMERCIAL

## 2022-10-05 ENCOUNTER — ANESTHESIA EVENT (OUTPATIENT)
Dept: GASTROENTEROLOGY | Facility: HOSPITAL | Age: 47
End: 2022-10-05

## 2022-10-05 ENCOUNTER — ANESTHESIA (OUTPATIENT)
Dept: GASTROENTEROLOGY | Facility: HOSPITAL | Age: 47
End: 2022-10-05

## 2022-10-05 VITALS
SYSTOLIC BLOOD PRESSURE: 127 MMHG | HEIGHT: 60 IN | DIASTOLIC BLOOD PRESSURE: 90 MMHG | OXYGEN SATURATION: 97 % | HEART RATE: 74 BPM | TEMPERATURE: 97.7 F | RESPIRATION RATE: 18 BRPM | WEIGHT: 245 LBS | BODY MASS INDEX: 48.1 KG/M2

## 2022-10-05 DIAGNOSIS — E66.01 OBESITY, CLASS III, BMI 40-49.9 (MORBID OBESITY) (HCC): ICD-10-CM

## 2022-10-05 PROBLEM — U09.9 POST-COVID SYNDROME: Chronic | Status: ACTIVE | Noted: 2022-10-05

## 2022-10-05 LAB
EXT PREGNANCY TEST URINE: NEGATIVE
EXT. CONTROL: NORMAL
GLUCOSE SERPL-MCNC: 150 MG/DL (ref 65–140)

## 2022-10-05 PROCEDURE — 88305 TISSUE EXAM BY PATHOLOGIST: CPT | Performed by: PATHOLOGY

## 2022-10-05 PROCEDURE — 88342 IMHCHEM/IMCYTCHM 1ST ANTB: CPT | Performed by: PATHOLOGY

## 2022-10-05 PROCEDURE — 81025 URINE PREGNANCY TEST: CPT | Performed by: ANESTHESIOLOGY

## 2022-10-05 PROCEDURE — 82948 REAGENT STRIP/BLOOD GLUCOSE: CPT

## 2022-10-05 PROCEDURE — 43239 EGD BIOPSY SINGLE/MULTIPLE: CPT | Performed by: SURGERY

## 2022-10-05 RX ORDER — LIDOCAINE HYDROCHLORIDE 20 MG/ML
INJECTION, SOLUTION EPIDURAL; INFILTRATION; INTRACAUDAL; PERINEURAL AS NEEDED
Status: DISCONTINUED | OUTPATIENT
Start: 2022-10-05 | End: 2022-10-05

## 2022-10-05 RX ORDER — SODIUM CHLORIDE 9 MG/ML
125 INJECTION, SOLUTION INTRAVENOUS CONTINUOUS
Status: DISCONTINUED | OUTPATIENT
Start: 2022-10-05 | End: 2022-10-09 | Stop reason: HOSPADM

## 2022-10-05 RX ORDER — PROPOFOL 10 MG/ML
INJECTION, EMULSION INTRAVENOUS AS NEEDED
Status: DISCONTINUED | OUTPATIENT
Start: 2022-10-05 | End: 2022-10-05

## 2022-10-05 RX ADMIN — PROPOFOL 150 MG: 10 INJECTION, EMULSION INTRAVENOUS at 08:10

## 2022-10-05 RX ADMIN — LIDOCAINE HYDROCHLORIDE 100 MG: 20 INJECTION, SOLUTION EPIDURAL; INFILTRATION; INTRACAUDAL; PERINEURAL at 08:10

## 2022-10-05 RX ADMIN — SODIUM CHLORIDE 125 ML/HR: 0.9 INJECTION, SOLUTION INTRAVENOUS at 07:58

## 2022-10-05 NOTE — ANESTHESIA PREPROCEDURE EVALUATION
Procedure:  EGD    Relevant Problems   CARDIO   (+) Essential hypertension   (+) Migraine without aura and without status migrainosus, not intractable   (+) Mixed hyperlipidemia      ENDO   (+) Type 2 diabetes mellitus without complication, without long-term current use of insulin (HCC)      MUSCULOSKELETAL   (+) Arthritis   (+) Osteoarthritis of both knees      NEURO/PSYCH   (+) Migraine without aura and without status migrainosus, not intractable      Other   (+) Obesity, Class III, BMI 40-49 9 (morbid obesity) ([facility] )        Physical Exam    Airway    Mallampati score: II  TM Distance: >3 FB  Neck ROM: full     Dental   No notable dental hx     Cardiovascular  Rhythm: regular, Rate: normal, Cardiovascular exam normal    Pulmonary  Pulmonary exam normal Breath sounds clear to auscultation,     Other Findings        Anesthesia Plan  ASA Score- 3     Anesthesia Type- general with ASA Monitors  Additional Monitors:   Airway Plan:           Plan Factors-    Chart reviewed  Patient summary reviewed  Induction-     Postoperative Plan-     Informed Consent- Anesthetic plan and risks discussed with patient  I personally reviewed this patient with the CRNA  Discussed and agreed on the Anesthesia Plan with the CRNA  Romero Ash

## 2022-10-05 NOTE — ANESTHESIA POSTPROCEDURE EVALUATION
Post-Op Assessment Note    No complications documented      BP      Temp      Pulse     Resp      SpO2      /90   Pulse 74   Temp 97 7 °F (36 5 °C) (Temporal)   Resp 18   Ht 5' (1 524 m)   Wt 111 kg (245 lb)   SpO2 97%   BMI 47 85 kg/m²

## 2022-10-05 NOTE — H&P
This is a 49 y o  female with a history of morbid obesity and Body mass index is 47 85 kg/m²  Here for an EGD to evaluate the anatomy of the GI tract  Physical Exam    /79   Pulse 77   Temp 97 7 °F (36 5 °C) (Temporal)   Resp 18   Ht 5' (1 524 m)   Wt 111 kg (245 lb)   SpO2 96%   BMI 47 85 kg/m²    AAOx3  RRR  CTA B  Abdomen obese  Benign  A/P:    This is a 49 y o  female with a history of morbid obesity and Body mass index is 47 85 kg/m²       Will proceed with the EGD and biopsies        Ramos Eaton  10/05/22  8:05 AM

## 2022-10-13 ENCOUNTER — OFFICE VISIT (OUTPATIENT)
Dept: BARIATRICS | Facility: CLINIC | Age: 47
End: 2022-10-13

## 2022-10-13 VITALS — HEIGHT: 60 IN | WEIGHT: 236.6 LBS | BODY MASS INDEX: 46.45 KG/M2

## 2022-10-13 DIAGNOSIS — E66.01 OBESITY, CLASS III, BMI 40-49.9 (MORBID OBESITY) (HCC): ICD-10-CM

## 2022-10-13 DIAGNOSIS — D64.9 ANEMIA: ICD-10-CM

## 2022-10-13 DIAGNOSIS — E66.9 DIABETES MELLITUS TYPE 2 IN OBESE (HCC): ICD-10-CM

## 2022-10-13 DIAGNOSIS — E11.69 DIABETES MELLITUS TYPE 2 IN OBESE (HCC): ICD-10-CM

## 2022-10-13 DIAGNOSIS — I10 ESSENTIAL HYPERTENSION: ICD-10-CM

## 2022-10-13 DIAGNOSIS — Z01.818 PRE-OPERATIVE CLEARANCE: Primary | ICD-10-CM

## 2022-10-13 PROCEDURE — RECHECK: Performed by: DIETITIAN, REGISTERED

## 2022-10-13 NOTE — PROGRESS NOTES
Bariatric Nutrition Assessment Note    Type of surgery    Preop  Surgery Date: TBD- 6 month program requirement     Today is 6/6 of program requirement     Surgeon: Dr Kieran Moreno  49 y o   female     Wt with BMI of 25: 127 3#  Pre-Op Excess Wt: 121 4#  Ht 5' (1 524 m)   Wt 107 kg (236 lb 9 6 oz)   BMI 46 21 kg/m²   Pt lost 3# since last appointment   She has lost a total of 12 1# ( 5% of her body weight ) since starting the program     Wt Readings from Last 3 Encounters:   10/13/22 107 kg (236 lb 9 6 oz)   10/05/22 111 kg (245 lb)   10/03/22 111 kg (245 lb 6 4 oz)       [place] Equation:  1685 kcal per day   Estimated calories for weight loss 2744-3267 kcal per day  ( 1-2# per wk wt loss - sedentary )  Estimated protein needs 58-69 grams per day (1 0-1 2 gms/kg IBW )   Estimated fluid needs 68 ounces (35 ml/kg IBW )      Weight History   Onset of Obesity: Adult- after birth of her daughter 29 years ago   Family history of obesity: Yes  Wt Loss Attempts: Counseling with  MD  High Protein/Low CHO diets (Atkins, Union, etc )  Medication prescribed for wt loss - topamax,, and intermittent fasting   Dietitian for wt loss and diabetes   Patient has tried the above for 6 months or more with insufficient weight loss or weight regain, which is why patient has requested to be evaluated for weight loss surgery today  Maximum Wt Lost: 20# with medication and high protein       Review of History and Medications   Past Medical History:   Diagnosis Date   • Diabetes mellitus ([facility] )    • Hypercholesteremia    • Hypertension    • Migraines      No past surgical history on file    Social History     Socioeconomic History   • Marital status:      Spouse name: Not on file   • Number of children: Not on file   • Years of education: Not on file   • Highest education level: Not on file   Occupational History   • Not on file   Tobacco Use   • Smoking status: Never Smoker • Smokeless tobacco: Never Used   Vaping Use   • Vaping Use: Never used   Substance and Sexual Activity   • Alcohol use: Yes     Comment: socially/rare   • Drug use: Never   • Sexual activity: Not on file   Other Topics Concern   • Not on file   Social History Narrative   • Not on file     Social Determinants of Health     Financial Resource Strain: Not on file   Food Insecurity: Not on file   Transportation Needs: Not on file   Physical Activity: Not on file   Stress: Not on file   Social Connections: Not on file   Intimate Partner Violence: Not on file   Housing Stability: Not on file       Current Outpatient Medications:   •  albuterol (PROVENTIL HFA,VENTOLIN HFA) 90 mcg/act inhaler, TAKE 2 PUFFS BY MOUTH EVERY 6 HOURS AS NEEDED FOR WHEEZE, Disp: , Rfl:   •  budesonide (Pulmicort Flexhaler) 90 MCG/ACT inhaler, Inhale 1 puff 2 (two) times a day Rinse mouth after use , Disp: , Rfl:   •  fenofibrate micronized (LOFIBRA) 134 MG capsule, Take 134 mg by mouth (Patient not taking: Reported on 10/3/2022), Disp: , Rfl:   •  fenofibrate micronized (LOFIBRA) 200 MG capsule, Take 200 mg by mouth daily, Disp: , Rfl:   •  hydrochlorothiazide (HYDRODIURIL) 25 mg tablet, Take 1 tablet by mouth daily, Disp: , Rfl:   •  lisinopril (ZESTRIL) 20 mg tablet, Take 1 tablet by mouth daily, Disp: , Rfl:   •  metFORMIN (GLUCOPHAGE) 500 mg tablet, Take 500 mg by mouth in the morning (Patient not taking: No sig reported), Disp: , Rfl:   •  metFORMIN (GLUCOPHAGE-XR) 500 mg 24 hr tablet, Take 500 mg by mouth daily with breakfast, Disp: , Rfl:   •  Multiple Vitamin (MULTIVITAMIN ADULT PO), Take 1 tablet by mouth daily, Disp: , Rfl:   •  omeprazole (PriLOSEC OTC) 20 MG tablet, Take 20 mg by mouth daily, Disp: , Rfl:   •  rizatriptan (MAXALT) 5 MG tablet, Take 5 mg by mouth once as needed for migraine May repeat in 2 hours if needed, Disp: , Rfl:   •  topiramate (TOPAMAX) 25 mg tablet, Take one to three pills by mouth in the evening as florecita crane of water, Disp: , Rfl:      Food Intake and Lifestyle Assessment   Food Intake Assessment completed via usual diet recall  Works from home 8 to 4 pm   Breakfast: 10 to 12 noon   Either home made turkey dover egg and bagel   OR- hummus and falafel and baba ganeshe with dover and egg   Snack: 0   Lunch: 0  Snack: low fat cheese stick or pre cut cheese   Dinner: 5 to 6 pm - cooks dinner for her father who she cares for   Generally will make pasta with meatsauce or frozen beef meatballs  Tacos, pasta fagioli ( vegetarian ) , chicken sausage ( italian with zuccini broccli advocado oil )  Thursday night take out - pizza, Javier or [employer]: 7:30 pm - cheetos, or doritos or pistachios or popcorn ( blue box microwave )   Beverage intake: water, sugar free beverages and turkey hill diet iced tea, powerade or gatorade zero   32 ounces Jose insulated mugs with 6 ice cubes   Occasional diet soda   Protein supplement: none currently - dislikes anything "milky" in texture   Estimated protein intake per day: 40-50 grams   Estimated fluid intake per day: > 64 ounces of fluid per day   Meals eaten away from home: once per week on Thursdays   Typical meal pattern: 2 meals per day and 2-3 snacks per day  Eating Behaviors: Frequent snacking/ grazing, Mindless eating and Craves salty foods  Food allergies or intolerances: Allergies   Allergen Reactions   • Simvastatin Other (See Comments)     Other reaction(s): MUSCLE SPASMS   • Latex Rash      Allergic to bananas and jason seeds   Cultural or Episcopal considerations: , avoids pork ,     Physical Assessment  Physical Activity  Types of exercise:  Yoga and swimming  Stopped during pandemic   Modified yoga at home   Current physical limitations: osteoarthritis in both knees but had to stop hiking due to accident       Psychosocial Assessment   Support systems: friend(s)  Socioeconomic factors: lives with  2 sons - 12 and 25, and caretaker for father,   Works full time from home Adult daughter living outside of the home     Nutrition Diagnosis- continued   Diagnosis: Overweight / Obesity (NC-3 3)  Related to: Physical inactivity and Excessive energy intake  As Evidenced by: BMI >25 and Unintentional weight gain     Nutrition Prescription: Recommend the following diet  1500 kcal per day/ 75 grams protein/ 170 grams carb/ 58 grams of fat and 15 grams of fiber   64-70 ounces of calorie free beverages per day     Interventions and Teaching   Discussed pre-op and post-op nutrition guidelines  Patient educated and handouts provided    Surgical changes to stomach / GI  Capacity of post-surgery stomach  Diet progression  Adequate hydration  Sugar and fat restriction to decrease "dumping syndrome"  Fat restriction to decrease steatorrhea  Expected weight loss  Weight loss plateaus/ possibility of weight regain  Exercise  Suggestions for pre-op diet  Nutrition considerations after surgery  Protein supplements  Appropriate carbohydrate, protein, and fat intake, and food/fluid choices to maximize safe weight loss, nutrient intake, and tolerance   Dietary and lifestyle changes  Possible problems with poor eating habits  Intuitive eating  Techniques for self monitoring and keeping daily food journal  Potential for food intolerance after surgery, and ways to deal with them including: lactose intolerance, nausea, reflux, vomiting, diarrhea, food intolerance, appetite changes, gas  Vitamin / Mineral supplementation of Multivitamin with minerals, Calcium, Vitamin B12, Iron, Fat Soluble vitamins and Vitamin D    Patient is not currently pregnant and doesn't desire to become pregnant a minimum of one year post-op    Education provided to: patient    Barriers to learning: No barriers identified    Readiness to change: preparation    Prior research on procedure: pre-op class and friends or family    Comprehension: needs reinforcement, verbalizes understanding and due to long term Covid side effects, has some short term memory issues      Expected Compliance: good    Workflow:  • Psych and/or D+A Clearance: N/A  • PCP Letter: [facility]- won't write letter until month before surgery   • Support Group: no  • Surgeon Appt  June 24th 2022  • EGD done  • Cardiac Risk Assessment done   • Sleep Studies N/A  • Blood work done   • Nicotine test N/A  • 6 Month Pre-Operative Program: 6/6  • Weight Loss goal 236#- met       Recommendations  Pt is an appropriate candidate for surgery  Yes    Evaluation / Monitoring  Pt forgot book but is reading it at home  Pt is eating two meals a day with one snack containing protein per day    Pt gets physical activity most days through modified yoga,  or walking  Pt has had severe joint  Pain with bad weather , so has not been walking as  as much  Her son and father are eating healthier, which is making meal times easier  Incorporating whole grain pasta, zoodles and lettuce instead of tortillas for wraps    Overall making lifestyle changes in preparation for surgery     Goals- continued   Complete lession plans 1-6, Eat 3 meals per day and Eliminate mindless snacking  Meal plan  B- 10 am   S- 1-2 pm - cheese stick  D- 6 pm - protein, veggies and 1/2 cup starch  64 ounces of more of fluid per day         Time Spent:   30 minutes

## 2022-11-08 ENCOUNTER — TELEPHONE (OUTPATIENT)
Dept: BARIATRICS | Facility: CLINIC | Age: 47
End: 2022-11-08

## 2022-11-08 LAB
ALBUMIN SERPL-MCNC: 4.3 G/DL (ref 3.8–4.8)
ALBUMIN/GLOB SERPL: 1.8 {RATIO} (ref 1.2–2.2)
ALP SERPL-CCNC: 84 IU/L (ref 44–121)
ALT SERPL-CCNC: 15 IU/L (ref 0–32)
AST SERPL-CCNC: 18 IU/L (ref 0–40)
BASOPHILS # BLD AUTO: 0.1 X10E3/UL (ref 0–0.2)
BASOPHILS NFR BLD AUTO: 1 %
BILIRUB SERPL-MCNC: 0.3 MG/DL (ref 0–1.2)
BUN SERPL-MCNC: 15 MG/DL (ref 6–24)
BUN/CREAT SERPL: 20 (ref 9–23)
CALCIUM SERPL-MCNC: 9.4 MG/DL (ref 8.7–10.2)
CHLORIDE SERPL-SCNC: 98 MMOL/L (ref 96–106)
CO2 SERPL-SCNC: 24 MMOL/L (ref 20–29)
CREAT SERPL-MCNC: 0.74 MG/DL (ref 0.57–1)
EGFR: 100 ML/MIN/1.73
EOSINOPHIL # BLD AUTO: 0.6 X10E3/UL (ref 0–0.4)
EOSINOPHIL NFR BLD AUTO: 8 %
ERYTHROCYTE [DISTWIDTH] IN BLOOD BY AUTOMATED COUNT: 12.1 % (ref 11.7–15.4)
EST. AVERAGE GLUCOSE BLD GHB EST-MCNC: 137 MG/DL
GLOBULIN SER-MCNC: 2.4 G/DL (ref 1.5–4.5)
GLUCOSE SERPL-MCNC: 149 MG/DL (ref 70–99)
HBA1C MFR BLD: 6.4 % (ref 4.8–5.6)
HCT VFR BLD AUTO: 35.8 % (ref 34–46.6)
HGB BLD-MCNC: 12.2 G/DL (ref 11.1–15.9)
IMM GRANULOCYTES # BLD: 0 X10E3/UL (ref 0–0.1)
IMM GRANULOCYTES NFR BLD: 0 %
LYMPHOCYTES # BLD AUTO: 1.6 X10E3/UL (ref 0.7–3.1)
LYMPHOCYTES NFR BLD AUTO: 20 %
MCH RBC QN AUTO: 29.5 PG (ref 26.6–33)
MCHC RBC AUTO-ENTMCNC: 34.1 G/DL (ref 31.5–35.7)
MCV RBC AUTO: 87 FL (ref 79–97)
MONOCYTES # BLD AUTO: 0.6 X10E3/UL (ref 0.1–0.9)
MONOCYTES NFR BLD AUTO: 7 %
NEUTROPHILS # BLD AUTO: 5.1 X10E3/UL (ref 1.4–7)
NEUTROPHILS NFR BLD AUTO: 64 %
PLATELET # BLD AUTO: 287 X10E3/UL (ref 150–450)
POTASSIUM SERPL-SCNC: 4.2 MMOL/L (ref 3.5–5.2)
PROT SERPL-MCNC: 6.7 G/DL (ref 6–8.5)
RBC # BLD AUTO: 4.13 X10E6/UL (ref 3.77–5.28)
SODIUM SERPL-SCNC: 137 MMOL/L (ref 134–144)
WBC # BLD AUTO: 7.8 X10E3/UL (ref 3.4–10.8)
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
## 2022-11-08 NOTE — TELEPHONE ENCOUNTER
----- Message from [employer] sent at 11/8/2022 10:35 AM EST -----  Please call patient to let her know we received her preop labs and it is acceptable for surgery       Thank you,   PHAN Sandoval

## 2022-11-11 ENCOUNTER — OFFICE VISIT (OUTPATIENT)
Dept: BARIATRICS | Facility: CLINIC | Age: 47
End: 2022-11-11

## 2022-11-11 VITALS — WEIGHT: 236.7 LBS | BODY MASS INDEX: 46.23 KG/M2

## 2022-11-11 DIAGNOSIS — E66.01 OBESITY, CLASS III, BMI 40-49.9 (MORBID OBESITY) (HCC): Primary | ICD-10-CM

## 2022-11-16 ENCOUNTER — PREP FOR PROCEDURE (OUTPATIENT)
Dept: BARIATRICS | Facility: CLINIC | Age: 47
End: 2022-11-16

## 2022-11-16 DIAGNOSIS — E11.9 SEVERE DIABETES MELLITUS (HCC): ICD-10-CM

## 2022-11-16 DIAGNOSIS — I10 ESSENTIAL HYPERTENSION, BENIGN: ICD-10-CM

## 2022-11-16 DIAGNOSIS — E78.5 HYPERLIPIDEMIA, UNSPECIFIED HYPERLIPIDEMIA TYPE: ICD-10-CM

## 2022-11-16 DIAGNOSIS — E66.01 MORBID OBESITY (HCC): Primary | ICD-10-CM

## 2022-12-22 ENCOUNTER — CLINICAL SUPPORT (OUTPATIENT)
Dept: BARIATRICS | Facility: CLINIC | Age: 47
End: 2022-12-22

## 2022-12-22 DIAGNOSIS — E66.01 OBESITY, CLASS III, BMI 40-49.9 (MORBID OBESITY) (HCC): Primary | ICD-10-CM

## 2022-12-22 NOTE — PROGRESS NOTES
Weight Management Nutrition Class       Bariatric Surgeon: Dr Swapna Daly    Surgery: preop bypass     Class: Pre Op Class     Topics discussed today include:     Pt attended pre-op education session  Standardized packet of information for bariatric surgery was sent via email and was reviewed with pt  Importance of lifestyle change and development of regular exercise routine stressed  Pt given the opportunity to ask questions  Ensure pre-surgery drink instructions were given  Questions were answered  Pt verbalized understanding of all information provided  Pt appeared prepared for upcoming surgery  Pt  educated on two-week pre operative liver shrinking diet  Pt understands that the diet needs to be followed for 2 weeks prior to surgery  Handout reviewed  Emphasized the need to drink 80 ounces of fluid per day while on the diet  Reviewed pre-op ERAS drink, post-operative clear liquid, full liquid, and pureed diet, post-operative nutrition rules and facts, and post-operative bariatric multivitamin/mineral recommendations and brand comparison  Contact information provided for any questions/concerns  Patient was able to verbalize basic diet (protein, fluid, vitamin and mineral) recommendations and possible nutrition-related complications   Yes

## 2023-01-09 ENCOUNTER — TELEPHONE (OUTPATIENT)
Dept: BARIATRICS | Facility: CLINIC | Age: 48
End: 2023-01-09

## 2023-01-09 NOTE — PRE-PROCEDURE INSTRUCTIONS
Pre-Surgery Instructions:   Medication Instructions   • albuterol (PROVENTIL HFA,VENTOLIN HFA) 90 mcg/act inhaler Uses PRN- OK to take day of surgery   • budesonide (Pulmicort Flexhaler) 90 MCG/ACT inhaler Uses PRN- OK to take day of surgery   • fenofibrate micronized (LOFIBRA) 200 MG capsule Take day of surgery  • hydrochlorothiazide (HYDRODIURIL) 25 mg tablet Hold day of surgery  • lisinopril (ZESTRIL) 20 mg tablet Hold day of surgery  • metFORMIN (GLUCOPHAGE-XR) 500 mg 24 hr tablet Hold day of surgery  • Multiple Vitamin (MULTIVITAMIN ADULT PO) Stop taking 7 days prior to surgery  • omeprazole (PriLOSEC OTC) 20 MG tablet Take day of surgery  • rizatriptan (MAXALT) 5 MG tablet Uses PRN- OK to take day of surgery   • topiramate (TOPAMAX) 25 mg tablet Take night before surgery   Covid screening negative as per patient  Fully vaccinated  Reviewed showering and medication instructions  Instructed to stop NSAIDS and non prescribed vitamins 7 days prior to DOS  Tylenol is OK to take  Take medications morning of surgery with a small sip of water  Patient verbalized understanding  Advised NPO after MN and ASC will call with scheduled surgical time  Drinks provided by surgeon's office  Instructed as to when to consume  Patient verbalized understanding  Advised to contact surgeon's office for any illness prior to day of surgery

## 2023-01-12 ENCOUNTER — OFFICE VISIT (OUTPATIENT)
Dept: BARIATRICS | Facility: CLINIC | Age: 48
End: 2023-01-12

## 2023-01-12 VITALS
WEIGHT: 223 LBS | TEMPERATURE: 98 F | HEIGHT: 60 IN | SYSTOLIC BLOOD PRESSURE: 126 MMHG | HEART RATE: 105 BPM | DIASTOLIC BLOOD PRESSURE: 84 MMHG | BODY MASS INDEX: 43.78 KG/M2

## 2023-01-12 DIAGNOSIS — M25.561 CHRONIC PAIN OF BOTH KNEES: ICD-10-CM

## 2023-01-12 DIAGNOSIS — G43.009 MIGRAINE WITHOUT AURA AND WITHOUT STATUS MIGRAINOSUS, NOT INTRACTABLE: ICD-10-CM

## 2023-01-12 DIAGNOSIS — G89.29 CHRONIC PAIN OF BOTH KNEES: ICD-10-CM

## 2023-01-12 DIAGNOSIS — E66.9 DIABETES MELLITUS TYPE 2 IN OBESE (HCC): ICD-10-CM

## 2023-01-12 DIAGNOSIS — E66.01 OBESITY, CLASS III, BMI 40-49.9 (MORBID OBESITY) (HCC): Primary | ICD-10-CM

## 2023-01-12 DIAGNOSIS — E11.9 TYPE 2 DIABETES MELLITUS WITHOUT COMPLICATION, WITHOUT LONG-TERM CURRENT USE OF INSULIN (HCC): ICD-10-CM

## 2023-01-12 DIAGNOSIS — M19.90 ARTHRITIS: ICD-10-CM

## 2023-01-12 DIAGNOSIS — I10 ESSENTIAL HYPERTENSION: ICD-10-CM

## 2023-01-12 DIAGNOSIS — E11.69 DIABETES MELLITUS TYPE 2 IN OBESE (HCC): ICD-10-CM

## 2023-01-12 DIAGNOSIS — M25.562 CHRONIC PAIN OF BOTH KNEES: ICD-10-CM

## 2023-01-12 DIAGNOSIS — E78.2 MIXED HYPERLIPIDEMIA: ICD-10-CM

## 2023-01-12 DIAGNOSIS — M17.0 OSTEOARTHRITIS OF BOTH KNEES: ICD-10-CM

## 2023-01-12 RX ORDER — SCOLOPAMINE TRANSDERMAL SYSTEM 1 MG/1
1 PATCH, EXTENDED RELEASE TRANSDERMAL ONCE
Status: CANCELLED | OUTPATIENT
Start: 2023-01-16 | End: 2023-01-12

## 2023-01-12 RX ORDER — METRONIDAZOLE 500 MG/100ML
500 INJECTION, SOLUTION INTRAVENOUS ONCE
Status: CANCELLED | OUTPATIENT
Start: 2023-01-16 | End: 2023-01-12

## 2023-01-12 RX ORDER — COVID-19 ANTIGEN TEST
KIT MISCELLANEOUS
COMMUNITY
Start: 2022-12-13 | End: 2023-01-17

## 2023-01-12 RX ORDER — CELECOXIB 200 MG/1
200 CAPSULE ORAL ONCE
Status: CANCELLED | OUTPATIENT
Start: 2023-01-16 | End: 2023-01-12

## 2023-01-12 RX ORDER — CEFAZOLIN SODIUM 2 G/50ML
2000 SOLUTION INTRAVENOUS ONCE
Status: CANCELLED | OUTPATIENT
Start: 2023-01-16 | End: 2023-01-12

## 2023-01-12 RX ORDER — ACETAMINOPHEN 325 MG/1
975 TABLET ORAL ONCE
Status: CANCELLED | OUTPATIENT
Start: 2023-01-16 | End: 2023-01-12

## 2023-01-12 RX ORDER — HEPARIN SODIUM 5000 [USP'U]/ML
5000 INJECTION, SOLUTION INTRAVENOUS; SUBCUTANEOUS
Status: CANCELLED | OUTPATIENT
Start: 2023-01-16 | End: 2023-01-17

## 2023-01-12 RX ORDER — GABAPENTIN 300 MG/1
300 CAPSULE ORAL ONCE
Status: CANCELLED | OUTPATIENT
Start: 2023-01-16 | End: 2023-01-12

## 2023-01-12 NOTE — H&P (VIEW-ONLY)
BARIATRIC H&P - BARIATRIC SURGERY  Sachin Ro 49 y o  female MRN: [id number]  Unit/Bed#:  Encounter: [id number]      HPI:  Sachin Ro is a 49 y o  female who presents with a long-standing history of morbid obesity  She was found to be a good candidate to undergo a bariatric operation [facility]  She is here today to discuss details of her surgery  Review of Systems   All other systems reviewed and are negative  Historical Information   Past Medical History:   Diagnosis Date   • Arthritis    • Asthma     Allergic   • Bell's palsy     2013   • Diabetes mellitus ([address] )    • GERD (gastroesophageal reflux disease)    • Hypercholesteremia    • Hypertension    • Migraines    • Seasonal allergies      Past Surgical History:   Procedure Laterality Date   • COSMETIC SURGERY      Face-birth pia removal   • DILATION AND CURETTAGE OF UTERUS     • EGD     • WISDOM TOOTH EXTRACTION       Social History   Social History     Substance and Sexual Activity   Alcohol Use Yes    Comment: socially/rare     Social History     Substance and Sexual Activity   Drug Use Never     Social History     Tobacco Use   Smoking Status Never   Smokeless Tobacco Never     Family History: non-contributory    Meds/Allergies   all medications and allergies reviewed  Allergies   Allergen Reactions   • Simvastatin Other (See Comments)     Other reaction(s): MUSCLE SPASMS   • Latex Rash       Objective     Current Vitals:          Invasive Devices     None                 Physical Exam  Vitals and nursing note reviewed  Constitutional:       General: She is not in acute distress  Appearance: Normal appearance  She is well-developed  She is not diaphoretic  HENT:      Head: Normocephalic and atraumatic  Nose: Nose normal    Eyes:      General: No scleral icterus  Right eye: No discharge  Left eye: No discharge        Conjunctiva/sclera: Conjunctivae normal    Cardiovascular: Rate and Rhythm: Normal rate and regular rhythm  Heart sounds: Normal heart sounds  Pulmonary:      Effort: Pulmonary effort is normal  No respiratory distress  Breath sounds: Normal breath sounds  No stridor  No wheezing or rales  Chest:      Chest wall: No tenderness  Abdominal:      General: Bowel sounds are normal       Palpations: Abdomen is soft  Tenderness: There is no abdominal tenderness  There is no guarding or rebound  Comments: Abdomen is obese, soft and benign  Musculoskeletal:         General: No deformity  Normal range of motion  Cervical back: Normal range of motion and neck supple  Lymphadenopathy:      Cervical: No cervical adenopathy  Skin:     General: Skin is warm and dry  Findings: No erythema or rash  Neurological:      Mental Status: She is alert and oriented to person, place, and time  Psychiatric:         Behavior: Behavior normal          Thought Content: Thought content normal          Judgment: Judgment normal          Lab Results: I have personally reviewed pertinent lab results  Imaging: I have personally reviewed pertinent reports  EKG, Pathology, and Other Studies: I have personally reviewed pertinent reports  The endoscopy showed erosive gastritis  The biopsies revealed  Final Diagnosis  A  Stomach (biopsy):     - Focal active chronic gastritis involving antral and oxyntic mucosa  - Immunostain for Helicobacter is equivocal, favor negative (control stains appropriately)  - No intestinal metaplasia, dysplasia or neoplasia identified  Assessment/PLAN:    49 y o  female morbidly obese found to be a good candidate to undergo a weight loss operation upon being enrolled here at the [facility]     Patient has a long history of morbid obesity and is presenting to discuss the surgical weight loss options   Despite the patient best efforts patient was unable to lose any meaningful or sustainable weight using nonsurgical means  We had a long discussion regarding all the surgical weight-loss options at our disposal at this point and reviewed the risks and benefits of each procedure in details as it relates to her age, BMI and medical conditions  She has been pre certified to undergo a Laparoscopic Gisselle-en-Y gastric bypass possible sleeve gastrectomy  Here today to review her pre op test results  Has been medically cleared for the procedure  I have discussed with her at length the risks and benefits of the operation and reiterated the components of our multidisciplinary program and the importance of compliance and follow up in the post operative period  Although there is a great statistical chance of improvement or even resolution of most of her associated comorbidities, the results vary from patient to patient and they largely depend on her commitment  The patient was also instructed with regards to the importance of behavior modification, nutritional counseling, support meeting attendance and lifestyle changes that are important to ensure success  She was given the opportunity to ask questions and I have answered all of them  I have addressed with the patient the level of CODE STATUS for this hospital stay and after explaining the different options currently she wishes to be a Level I  She understands and wishes to proceed  She has lost all the weight required prior to surgery      Swapna Daly MD  1/12/2023  2:23 PM

## 2023-01-12 NOTE — H&P
BARIATRIC H&P - BARIATRIC SURGERY  Bandar Ramírez 49 y o  female MRN: [id number]  Unit/Bed#:  Encounter: [id number]      HPI:  Bandar Ramírez is a 49 y o  female who presents with a long-standing history of morbid obesity  She was found to be a good candidate to undergo a bariatric operation upon [facility]  She is here today to discuss details of her surgery  Review of Systems   All other systems reviewed and are negative  Historical Information   Past Medical History:   Diagnosis Date   • Arthritis    • Asthma     Allergic   • Bell's palsy     2013   • Diabetes mellitus ([facility] [address] )    • GERD (gastroesophageal reflux disease)    • Hypercholesteremia    • Hypertension    • Migraines    • Seasonal allergies      Past Surgical History:   Procedure Laterality Date   • COSMETIC SURGERY      Face-birth pia removal   • DILATION AND CURETTAGE OF UTERUS     • EGD     • WISDOM TOOTH EXTRACTION       Social History   Social History     Substance and Sexual Activity   Alcohol Use Yes    Comment: socially/rare     Social History     Substance and Sexual Activity   Drug Use Never     Social History     Tobacco Use   Smoking Status Never   Smokeless Tobacco Never     Family History: non-contributory    Meds/Allergies   all medications and allergies reviewed  Allergies   Allergen Reactions   • Simvastatin Other (See Comments)     Other reaction(s): MUSCLE SPASMS   • Latex Rash       Objective     Current Vitals:          Invasive Devices     None                 Physical Exam  Vitals and nursing note reviewed  Constitutional:       General: She is not in acute distress  Appearance: Normal appearance  She is well-developed  She is not diaphoretic  HENT:      Head: Normocephalic and atraumatic  Nose: Nose normal    Eyes:      General: No scleral icterus  Right eye: No discharge  Left eye: No discharge        Conjunctiva/sclera: Conjunctivae normal    Cardiovascular: Rate and Rhythm: Normal rate and regular rhythm  Heart sounds: Normal heart sounds  Pulmonary:      Effort: Pulmonary effort is normal  No respiratory distress  Breath sounds: Normal breath sounds  No stridor  No wheezing or rales  Chest:      Chest wall: No tenderness  Abdominal:      General: Bowel sounds are normal       Palpations: Abdomen is soft  Tenderness: There is no abdominal tenderness  There is no guarding or rebound  Comments: Abdomen is obese, soft and benign  Musculoskeletal:         General: No deformity  Normal range of motion  Cervical back: Normal range of motion and neck supple  Lymphadenopathy:      Cervical: No cervical adenopathy  Skin:     General: Skin is warm and dry  Findings: No erythema or rash  Neurological:      Mental Status: She is alert and oriented to person, place, and time  Psychiatric:         Behavior: Behavior normal          Thought Content: Thought content normal          Judgment: Judgment normal          Lab Results: I have personally reviewed pertinent lab results  Imaging: I have personally reviewed pertinent reports  EKG, Pathology, and Other Studies: I have personally reviewed pertinent reports  The endoscopy showed erosive gastritis  The biopsies revealed  Final Diagnosis  A  Stomach (biopsy):     - Focal active chronic gastritis involving antral and oxyntic mucosa  - Immunostain for Helicobacter is equivocal, favor negative (control stains appropriately)  - No intestinal metaplasia, dysplasia or neoplasia identified  Assessment/PLAN:    49 y o  female morbidly obese found to be a good candidate to undergo a weight loss operation upon being enrolled here at the [facility]     Patient has a long history of morbid obesity and is presenting to discuss the surgical weight loss options   Despite the patient best efforts patient was unable to lose any meaningful or sustainable weight using nonsurgical means  We had a long discussion regarding all the surgical weight-loss options at our disposal at this point and reviewed the risks and benefits of each procedure in details as it relates to her age, BMI and medical conditions  She has been pre certified to undergo a Laparoscopic Gisselle-en-Y gastric bypass possible sleeve gastrectomy  Here today to review her pre op test results  Has been medically cleared for the procedure  I have discussed with her at length the risks and benefits of the operation and reiterated the components of our multidisciplinary program and the importance of compliance and follow up in the post operative period  Although there is a great statistical chance of improvement or even resolution of most of her associated comorbidities, the results vary from patient to patient and they largely depend on her commitment  The patient was also instructed with regards to the importance of behavior modification, nutritional counseling, support meeting attendance and lifestyle changes that are important to ensure success  She was given the opportunity to ask questions and I have answered all of them  I have addressed with the patient the level of CODE STATUS for this hospital stay and after explaining the different options currently she wishes to be a Level I  She understands and wishes to proceed  She has lost all the weight required prior to surgery      Montserrat Teresa MD  1/12/2023  2:23 PM

## 2023-01-13 DIAGNOSIS — E66.01 OBESITY, CLASS III, BMI 40-49.9 (MORBID OBESITY) (HCC): Primary | ICD-10-CM

## 2023-01-13 RX ORDER — OXYCODONE HYDROCHLORIDE 5 MG/1
5 TABLET ORAL EVERY 4 HOURS PRN
Qty: 10 TABLET | Refills: 0 | Status: SHIPPED | OUTPATIENT
Start: 2023-01-17

## 2023-01-13 RX ORDER — OMEPRAZOLE 20 MG/1
20 CAPSULE, DELAYED RELEASE ORAL DAILY
Qty: 30 CAPSULE | Refills: 3 | Status: SHIPPED | OUTPATIENT
Start: 2023-01-13

## 2023-01-13 NOTE — TELEPHONE ENCOUNTER
Po meds for Laparoscopic BALJINDER EN Y Gastric Bypass possible Sleeve Gastrectomy, SX 1/16/2023 Reji Bishop      **Allergy**         Simvastatin        Latex

## 2023-01-16 ENCOUNTER — HOSPITAL ENCOUNTER (INPATIENT)
Facility: HOSPITAL | Age: 48
LOS: 1 days | Discharge: HOME | End: 2023-01-17
Attending: SURGERY | Admitting: SURGERY

## 2023-01-16 ENCOUNTER — ANESTHESIA (OUTPATIENT)
Dept: PERIOP | Facility: HOSPITAL | Age: 48
End: 2023-01-16

## 2023-01-16 ENCOUNTER — ANESTHESIA EVENT (OUTPATIENT)
Dept: PERIOP | Facility: HOSPITAL | Age: 48
End: 2023-01-16

## 2023-01-16 DIAGNOSIS — E66.9 DIABETES MELLITUS TYPE 2 IN OBESE (HCC): ICD-10-CM

## 2023-01-16 DIAGNOSIS — E11.69 DIABETES MELLITUS TYPE 2 IN OBESE (HCC): ICD-10-CM

## 2023-01-16 DIAGNOSIS — E66.01 OBESITY, CLASS III, BMI 40-49.9 (MORBID OBESITY) (HCC): Primary | ICD-10-CM

## 2023-01-16 DIAGNOSIS — I10 ESSENTIAL HYPERTENSION: ICD-10-CM

## 2023-01-16 PROBLEM — R06.09 COVID-19 LONG HAULER MANIFESTING CHRONIC DYSPNEA: Status: ACTIVE | Noted: 2022-10-05

## 2023-01-16 PROBLEM — U09.9 COVID-19 LONG HAULER MANIFESTING CHRONIC DYSPNEA: Status: ACTIVE | Noted: 2022-10-05

## 2023-01-16 LAB
EXT PREGNANCY TEST URINE: NEGATIVE
EXT. CONTROL: NORMAL
FLUAV RNA RESP QL NAA+PROBE: NEGATIVE
FLUBV RNA RESP QL NAA+PROBE: NEGATIVE
GLUCOSE SERPL-MCNC: 116 MG/DL (ref 65–140)
GLUCOSE SERPL-MCNC: 268 MG/DL (ref 65–140)
RSV RNA RESP QL NAA+PROBE: NEGATIVE
SARS-COV-2 RNA RESP QL NAA+PROBE: NEGATIVE

## 2023-01-16 PROCEDURE — 0D164ZA BYPASS STOMACH TO JEJUNUM, PERCUTANEOUS ENDOSCOPIC APPROACH: ICD-10-PCS | Performed by: SURGERY

## 2023-01-16 PROCEDURE — 0DJ08ZZ INSPECTION OF UPPER INTESTINAL TRACT, VIA NATURAL OR ARTIFICIAL OPENING ENDOSCOPIC: ICD-10-PCS | Performed by: SURGERY

## 2023-01-16 DEVICE — SEAMGUARD STPL REINF ENDO GIA ULTRA UNIV 60 PURPLE: Type: IMPLANTABLE DEVICE | Site: ABDOMEN | Status: FUNCTIONAL

## 2023-01-16 RX ORDER — FENTANYL CITRATE/PF 50 MCG/ML
25 SYRINGE (ML) INJECTION
Status: COMPLETED | OUTPATIENT
Start: 2023-01-16 | End: 2023-01-16

## 2023-01-16 RX ORDER — TOPIRAMATE 25 MG/1
25 TABLET ORAL DAILY
Status: DISCONTINUED | OUTPATIENT
Start: 2023-01-16 | End: 2023-01-17 | Stop reason: HOSPADM

## 2023-01-16 RX ORDER — CEFAZOLIN SODIUM 2 G/50ML
2000 SOLUTION INTRAVENOUS EVERY 8 HOURS
Status: COMPLETED | OUTPATIENT
Start: 2023-01-16 | End: 2023-01-17

## 2023-01-16 RX ORDER — MORPHINE SULFATE 4 MG/ML
4 INJECTION, SOLUTION INTRAMUSCULAR; INTRAVENOUS EVERY 4 HOURS PRN
Status: DISCONTINUED | OUTPATIENT
Start: 2023-01-16 | End: 2023-01-17 | Stop reason: HOSPADM

## 2023-01-16 RX ORDER — METRONIDAZOLE 500 MG/100ML
500 INJECTION, SOLUTION INTRAVENOUS EVERY 8 HOURS
Status: COMPLETED | OUTPATIENT
Start: 2023-01-16 | End: 2023-01-17

## 2023-01-16 RX ORDER — OXYCODONE HCL 5 MG/5 ML
10 SOLUTION, ORAL ORAL EVERY 4 HOURS PRN
Status: DISCONTINUED | OUTPATIENT
Start: 2023-01-16 | End: 2023-01-17 | Stop reason: HOSPADM

## 2023-01-16 RX ORDER — CELECOXIB 200 MG/1
200 CAPSULE ORAL ONCE
Status: COMPLETED | OUTPATIENT
Start: 2023-01-16 | End: 2023-01-16

## 2023-01-16 RX ORDER — DEXAMETHASONE SODIUM PHOSPHATE 10 MG/ML
INJECTION, SOLUTION INTRAMUSCULAR; INTRAVENOUS AS NEEDED
Status: DISCONTINUED | OUTPATIENT
Start: 2023-01-16 | End: 2023-01-16

## 2023-01-16 RX ORDER — BUPIVACAINE HYDROCHLORIDE 5 MG/ML
INJECTION, SOLUTION EPIDURAL; INTRACAUDAL AS NEEDED
Status: DISCONTINUED | OUTPATIENT
Start: 2023-01-16 | End: 2023-01-16 | Stop reason: HOSPADM

## 2023-01-16 RX ORDER — ACETAMINOPHEN 160 MG/5ML
975 SUSPENSION, ORAL (FINAL DOSE FORM) ORAL EVERY 8 HOURS
Status: DISCONTINUED | OUTPATIENT
Start: 2023-01-16 | End: 2023-01-17 | Stop reason: HOSPADM

## 2023-01-16 RX ORDER — MORPHINE SULFATE 10 MG/ML
4 INJECTION, SOLUTION INTRAMUSCULAR; INTRAVENOUS EVERY 4 HOURS PRN
Status: DISCONTINUED | OUTPATIENT
Start: 2023-01-16 | End: 2023-01-16

## 2023-01-16 RX ORDER — CEFAZOLIN SODIUM 2 G/50ML
2000 SOLUTION INTRAVENOUS ONCE
Status: COMPLETED | OUTPATIENT
Start: 2023-01-16 | End: 2023-01-16

## 2023-01-16 RX ORDER — DIPHENHYDRAMINE HCL 25 MG
25 TABLET ORAL EVERY 8 HOURS PRN
Status: DISCONTINUED | OUTPATIENT
Start: 2023-01-16 | End: 2023-01-17 | Stop reason: HOSPADM

## 2023-01-16 RX ORDER — PROPOFOL 10 MG/ML
INJECTION, EMULSION INTRAVENOUS AS NEEDED
Status: DISCONTINUED | OUTPATIENT
Start: 2023-01-16 | End: 2023-01-16

## 2023-01-16 RX ORDER — SIMETHICONE 80 MG
80 TABLET,CHEWABLE ORAL 4 TIMES DAILY PRN
Status: DISCONTINUED | OUTPATIENT
Start: 2023-01-16 | End: 2023-01-17 | Stop reason: HOSPADM

## 2023-01-16 RX ORDER — SCOLOPAMINE TRANSDERMAL SYSTEM 1 MG/1
1 PATCH, EXTENDED RELEASE TRANSDERMAL ONCE
Status: DISCONTINUED | OUTPATIENT
Start: 2023-01-16 | End: 2023-01-17 | Stop reason: HOSPADM

## 2023-01-16 RX ORDER — HEPARIN SODIUM 5000 [USP'U]/ML
5000 INJECTION, SOLUTION INTRAVENOUS; SUBCUTANEOUS
Status: COMPLETED | OUTPATIENT
Start: 2023-01-16 | End: 2023-01-16

## 2023-01-16 RX ORDER — HYDROMORPHONE HCL/PF 1 MG/ML
SYRINGE (ML) INJECTION AS NEEDED
Status: DISCONTINUED | OUTPATIENT
Start: 2023-01-16 | End: 2023-01-16

## 2023-01-16 RX ORDER — ACETAMINOPHEN 325 MG/1
975 TABLET ORAL EVERY 8 HOURS
Status: DISCONTINUED | OUTPATIENT
Start: 2023-01-16 | End: 2023-01-17 | Stop reason: HOSPADM

## 2023-01-16 RX ORDER — ALBUTEROL SULFATE 90 UG/1
2 AEROSOL, METERED RESPIRATORY (INHALATION) EVERY 4 HOURS PRN
Status: DISCONTINUED | OUTPATIENT
Start: 2023-01-16 | End: 2023-01-17 | Stop reason: HOSPADM

## 2023-01-16 RX ORDER — SODIUM CHLORIDE, SODIUM LACTATE, POTASSIUM CHLORIDE, CALCIUM CHLORIDE 600; 310; 30; 20 MG/100ML; MG/100ML; MG/100ML; MG/100ML
INJECTION, SOLUTION INTRAVENOUS CONTINUOUS PRN
Status: DISCONTINUED | OUTPATIENT
Start: 2023-01-16 | End: 2023-01-16

## 2023-01-16 RX ORDER — MIDAZOLAM HYDROCHLORIDE 2 MG/2ML
INJECTION, SOLUTION INTRAMUSCULAR; INTRAVENOUS AS NEEDED
Status: DISCONTINUED | OUTPATIENT
Start: 2023-01-16 | End: 2023-01-16

## 2023-01-16 RX ORDER — ONDANSETRON 2 MG/ML
INJECTION INTRAMUSCULAR; INTRAVENOUS AS NEEDED
Status: DISCONTINUED | OUTPATIENT
Start: 2023-01-16 | End: 2023-01-16

## 2023-01-16 RX ORDER — SODIUM CHLORIDE 9 MG/ML
125 INJECTION, SOLUTION INTRAVENOUS CONTINUOUS
Status: DISCONTINUED | OUTPATIENT
Start: 2023-01-16 | End: 2023-01-17

## 2023-01-16 RX ORDER — ONDANSETRON 2 MG/ML
4 INJECTION INTRAMUSCULAR; INTRAVENOUS ONCE AS NEEDED
Status: DISCONTINUED | OUTPATIENT
Start: 2023-01-16 | End: 2023-01-16 | Stop reason: HOSPADM

## 2023-01-16 RX ORDER — ACETAMINOPHEN 325 MG/1
975 TABLET ORAL ONCE
Status: COMPLETED | OUTPATIENT
Start: 2023-01-16 | End: 2023-01-16

## 2023-01-16 RX ORDER — LIDOCAINE HYDROCHLORIDE 20 MG/ML
INJECTION, SOLUTION EPIDURAL; INFILTRATION; INTRACAUDAL; PERINEURAL AS NEEDED
Status: DISCONTINUED | OUTPATIENT
Start: 2023-01-16 | End: 2023-01-16

## 2023-01-16 RX ORDER — GABAPENTIN 300 MG/1
300 CAPSULE ORAL ONCE
Status: COMPLETED | OUTPATIENT
Start: 2023-01-16 | End: 2023-01-16

## 2023-01-16 RX ORDER — SODIUM CHLORIDE 9 MG/ML
INJECTION, SOLUTION INTRAVENOUS AS NEEDED
Status: DISCONTINUED | OUTPATIENT
Start: 2023-01-16 | End: 2023-01-16 | Stop reason: HOSPADM

## 2023-01-16 RX ORDER — SODIUM CHLORIDE, SODIUM LACTATE, POTASSIUM CHLORIDE, CALCIUM CHLORIDE 600; 310; 30; 20 MG/100ML; MG/100ML; MG/100ML; MG/100ML
100 INJECTION, SOLUTION INTRAVENOUS CONTINUOUS
Status: DISCONTINUED | OUTPATIENT
Start: 2023-01-16 | End: 2023-01-17

## 2023-01-16 RX ORDER — FAMOTIDINE 10 MG/ML
20 INJECTION, SOLUTION INTRAVENOUS 2 TIMES DAILY
Status: DISCONTINUED | OUTPATIENT
Start: 2023-01-16 | End: 2023-01-17 | Stop reason: HOSPADM

## 2023-01-16 RX ORDER — ROCURONIUM BROMIDE 10 MG/ML
INJECTION, SOLUTION INTRAVENOUS AS NEEDED
Status: DISCONTINUED | OUTPATIENT
Start: 2023-01-16 | End: 2023-01-16

## 2023-01-16 RX ORDER — OXYCODONE HCL 5 MG/5 ML
5 SOLUTION, ORAL ORAL EVERY 4 HOURS PRN
Status: DISCONTINUED | OUTPATIENT
Start: 2023-01-16 | End: 2023-01-17 | Stop reason: HOSPADM

## 2023-01-16 RX ORDER — ONDANSETRON 2 MG/ML
4 INJECTION INTRAMUSCULAR; INTRAVENOUS EVERY 6 HOURS PRN
Status: DISCONTINUED | OUTPATIENT
Start: 2023-01-16 | End: 2023-01-17 | Stop reason: HOSPADM

## 2023-01-16 RX ORDER — PROMETHAZINE HYDROCHLORIDE 25 MG/ML
25 INJECTION, SOLUTION INTRAMUSCULAR; INTRAVENOUS EVERY 6 HOURS PRN
Status: DISCONTINUED | OUTPATIENT
Start: 2023-01-16 | End: 2023-01-17 | Stop reason: HOSPADM

## 2023-01-16 RX ORDER — EPHEDRINE SULFATE 50 MG/ML
INJECTION INTRAVENOUS AS NEEDED
Status: DISCONTINUED | OUTPATIENT
Start: 2023-01-16 | End: 2023-01-16

## 2023-01-16 RX ORDER — FENTANYL CITRATE 50 UG/ML
INJECTION, SOLUTION INTRAMUSCULAR; INTRAVENOUS AS NEEDED
Status: DISCONTINUED | OUTPATIENT
Start: 2023-01-16 | End: 2023-01-16

## 2023-01-16 RX ORDER — METRONIDAZOLE 500 MG/100ML
500 INJECTION, SOLUTION INTRAVENOUS ONCE
Status: COMPLETED | OUTPATIENT
Start: 2023-01-16 | End: 2023-01-16

## 2023-01-16 RX ORDER — METOCLOPRAMIDE HYDROCHLORIDE 5 MG/ML
10 INJECTION INTRAMUSCULAR; INTRAVENOUS EVERY 6 HOURS PRN
Status: DISCONTINUED | OUTPATIENT
Start: 2023-01-16 | End: 2023-01-17 | Stop reason: HOSPADM

## 2023-01-16 RX ADMIN — ROCURONIUM BROMIDE 10 MG: 10 INJECTION, SOLUTION INTRAVENOUS at 09:05

## 2023-01-16 RX ADMIN — SODIUM CHLORIDE 125 ML/HR: 0.9 INJECTION, SOLUTION INTRAVENOUS at 07:03

## 2023-01-16 RX ADMIN — ONDANSETRON 4 MG: 2 INJECTION INTRAMUSCULAR; INTRAVENOUS at 09:33

## 2023-01-16 RX ADMIN — FAMOTIDINE 20 MG: 10 INJECTION, SOLUTION INTRAVENOUS at 21:52

## 2023-01-16 RX ADMIN — OXYCODONE HYDROCHLORIDE 10 MG: 5 SOLUTION ORAL at 15:58

## 2023-01-16 RX ADMIN — FENTANYL CITRATE 25 MCG: 50 INJECTION, SOLUTION INTRAMUSCULAR; INTRAVENOUS at 10:23

## 2023-01-16 RX ADMIN — FENTANYL CITRATE 100 MCG: 50 INJECTION INTRAMUSCULAR; INTRAVENOUS at 07:33

## 2023-01-16 RX ADMIN — ROCURONIUM BROMIDE 20 MG: 10 INJECTION, SOLUTION INTRAVENOUS at 08:22

## 2023-01-16 RX ADMIN — CEFAZOLIN SODIUM 2000 MG: 2 SOLUTION INTRAVENOUS at 15:54

## 2023-01-16 RX ADMIN — PROPOFOL 200 MG: 10 INJECTION, EMULSION INTRAVENOUS at 07:33

## 2023-01-16 RX ADMIN — LIDOCAINE HYDROCHLORIDE 100 MG: 20 INJECTION, SOLUTION EPIDURAL; INFILTRATION; INTRACAUDAL; PERINEURAL at 07:33

## 2023-01-16 RX ADMIN — ONDANSETRON 4 MG: 2 INJECTION INTRAMUSCULAR; INTRAVENOUS at 12:02

## 2023-01-16 RX ADMIN — MORPHINE SULFATE 4 MG: 4 INJECTION INTRAVENOUS at 12:02

## 2023-01-16 RX ADMIN — ACETAMINOPHEN 975 MG: 325 SUSPENSION ORAL at 21:52

## 2023-01-16 RX ADMIN — GABAPENTIN 300 MG: 300 CAPSULE ORAL at 06:37

## 2023-01-16 RX ADMIN — ACETAMINOPHEN 975 MG: 325 TABLET, FILM COATED ORAL at 06:36

## 2023-01-16 RX ADMIN — MIDAZOLAM 2 MG: 1 INJECTION INTRAMUSCULAR; INTRAVENOUS at 07:26

## 2023-01-16 RX ADMIN — SODIUM CHLORIDE, SODIUM LACTATE, POTASSIUM CHLORIDE, AND CALCIUM CHLORIDE: .6; .31; .03; .02 INJECTION, SOLUTION INTRAVENOUS at 09:15

## 2023-01-16 RX ADMIN — FENTANYL CITRATE 25 MCG: 50 INJECTION, SOLUTION INTRAMUSCULAR; INTRAVENOUS at 10:15

## 2023-01-16 RX ADMIN — METRONIDAZOLE: 500 INJECTION, SOLUTION INTRAVENOUS at 07:37

## 2023-01-16 RX ADMIN — CELECOXIB 200 MG: 200 CAPSULE ORAL at 06:37

## 2023-01-16 RX ADMIN — HYDROMORPHONE HYDROCHLORIDE 1 MG: 1 INJECTION, SOLUTION INTRAMUSCULAR; INTRAVENOUS; SUBCUTANEOUS at 08:49

## 2023-01-16 RX ADMIN — SCOPALAMINE 1 PATCH: 1 PATCH, EXTENDED RELEASE TRANSDERMAL at 06:37

## 2023-01-16 RX ADMIN — METRONIDAZOLE 500 MG: 500 INJECTION, SOLUTION INTRAVENOUS at 16:33

## 2023-01-16 RX ADMIN — CEFAZOLIN SODIUM 2000 MG: 2 SOLUTION INTRAVENOUS at 07:27

## 2023-01-16 RX ADMIN — ROCURONIUM BROMIDE 50 MG: 10 INJECTION, SOLUTION INTRAVENOUS at 07:33

## 2023-01-16 RX ADMIN — SODIUM CHLORIDE: 0.9 INJECTION, SOLUTION INTRAVENOUS at 08:19

## 2023-01-16 RX ADMIN — EPHEDRINE SULFATE 10 MG: 50 INJECTION, SOLUTION INTRAVENOUS at 08:44

## 2023-01-16 RX ADMIN — SODIUM CHLORIDE, SODIUM LACTATE, POTASSIUM CHLORIDE, AND CALCIUM CHLORIDE 100 ML/HR: 600; 310; 30; 20 INJECTION, SOLUTION INTRAVENOUS at 15:58

## 2023-01-16 RX ADMIN — SODIUM CHLORIDE: 9 INJECTION, SOLUTION INTRAMUSCULAR; INTRAVENOUS; SUBCUTANEOUS at 07:14

## 2023-01-16 RX ADMIN — FENTANYL CITRATE 25 MCG: 50 INJECTION, SOLUTION INTRAMUSCULAR; INTRAVENOUS at 10:34

## 2023-01-16 RX ADMIN — FENTANYL CITRATE 25 MCG: 50 INJECTION, SOLUTION INTRAMUSCULAR; INTRAVENOUS at 10:50

## 2023-01-16 RX ADMIN — SUGAMMADEX 200 MG: 100 INJECTION, SOLUTION INTRAVENOUS at 09:33

## 2023-01-16 RX ADMIN — HEPARIN SODIUM 5000 UNITS: 5000 INJECTION INTRAVENOUS; SUBCUTANEOUS at 07:07

## 2023-01-16 RX ADMIN — HYDROMORPHONE HYDROCHLORIDE 0.5 MG: 1 INJECTION, SOLUTION INTRAMUSCULAR; INTRAVENOUS; SUBCUTANEOUS at 09:13

## 2023-01-16 RX ADMIN — DEXAMETHASONE SODIUM PHOSPHATE 4 MG: 10 INJECTION INTRAMUSCULAR; INTRAVENOUS at 07:37

## 2023-01-16 RX ADMIN — EPHEDRINE SULFATE 5 MG: 50 INJECTION, SOLUTION INTRAVENOUS at 08:20

## 2023-01-16 NOTE — INTERVAL H&P NOTE
H&P reviewed  After examining the patient I find no changes in the patients condition since the H&P had been written      Vitals:    01/16/23 0609   BP: 120/73   Pulse: 104   Resp: 14   Temp: 98 2 °F (36 8 °C)   SpO2: 97%

## 2023-01-16 NOTE — PLAN OF CARE
Problem: PAIN - ADULT  Goal: Verbalizes/displays adequate comfort level or baseline comfort level  Description: Interventions:  - Encourage patient to monitor pain and request assistance  - Assess pain using appropriate pain scale  - Administer analgesics based on type and severity of pain and evaluate response  - Implement non-pharmacological measures as appropriate and evaluate response  - Consider cultural and social influences on pain and pain management  - Notify physician/advanced practitioner if interventions unsuccessful or patient reports new pain  Outcome: Progressing     Problem: GASTROINTESTINAL - ADULT  Goal: Minimal or absence of nausea and/or vomiting  Description: INTERVENTIONS:  - Administer IV fluids if ordered to ensure adequate hydration  - Maintain NPO status until nausea and vomiting are resolved  - Nasogastric tube if ordered  - Administer ordered antiemetic medications as needed  - Provide nonpharmacologic comfort measures as appropriate  - Advance diet as tolerated, if ordered  - Consider nutrition services referral to assist patient with adequate nutrition and appropriate food choices  Outcome: Progressing     Problem: SKIN/TISSUE INTEGRITY - ADULT  Goal: Incision(s), wounds(s) or drain site(s) healing without S/S of infection  Description: INTERVENTIONS  - Assess and document dressing, incision, wound bed, drain sites and surrounding tissue  - Provide patient and family education  Outcome: Progressing

## 2023-01-16 NOTE — OP NOTE
[facility]   [address] [facility], [address]  [phone] (Fax)      Operative Report  BYPASS GASTRIC  R-N-Y LAP PSB SLEEVE GASTRECTOMY     Patient Name: Salazar Reeder    :  [id number]  MRN: [id number]  Patient Location: [facility] OR ROOM 06  Surgery Date : 2023  Surgeons:  Surgeon(s) and Role:     * Gina Hampton MD - Primary     * Francisco Mc DO - Assisting    Diagnosis:    Pre-Op Diagnosis Codes: Morbid obesity ([facility] ) [E66 01]  Body mass index is 43 83 kg/m²  Severe diabetes mellitus (HCC) [E11 9]  Essential hypertension, benign [I10]  Hyperlipidemia, unspecified hyperlipidemia type [E78 5]    Post-Op Diagnosis Codes:     * Morbid obesity ([facility] ) [E66 01]     * Severe diabetes mellitus ([facility] ) [E11 9]     * Essential hypertension, benign [I10]     * Hyperlipidemia, unspecified hyperlipidemia type [E78 5]      * Body mass index is 43 83 kg/m²  Procedure  1  Laparoscopic Gisselle-en-Y Gastric Bypass  2  Intraoperative Endoscopy    Specimen(s):  * No specimens in log *    Estimated Blood Loss:    20 mL     Anesthesia Type:     General    Operative Indications: Morbid obesity ([facility] ) [E66 01]  Severe diabetes mellitus ([facility] ) [E11 9]  Essential hypertension, benign [I10]  Hyperlipidemia, unspecified hyperlipidemia type [E78 5]  Body mass index is 43 83 kg/m²  Operative Findings:    Normal    Complications:     None    Procedure and Technique:    INDICATION:    Salazar Reeder is a 49 y o  female with a Body mass index is 43 83 kg/m²  and a long standing history of morbid obesity and inability to lose a significant amount of weight on its own  This patient was found to be a good candidate to undergo a bariatric procedure upon being enrolled here at the [address] [place] [address]  OPERATIVE TECHNIQUE    The patient was taken to the operating room and placed in a supine position   A dose of IV antibiotic prophylaxis that consisted of Ancef 2g and Metronidazole 500mg was given  Also, 5000 units of subcutaneous unfractionated heparin to prevent DVT were administered  Sequential compression devices were placed on both lower extremities  After satisfactory general anesthesia induction and endotracheal intubation was achieved, the extremities were secured to prevent neurovascular and musculoskeletal injuries as best as possible  Subsequently, the abdominal wall was prepped and draped in a surgical standard sterile fashion  After a timeout was done and the patient was properly identified and the type of procedure was confirmed a supra-umbilical transverse skin incision was made, and the subcutaneous tissues dissected  Access to the peritoneal cavity was gained with an optical trocar  With this device, we were able to visualize the layers of the abdominal wall, and enter the peritoneal cavity under direct visualization  Pneumoperitoneum was then established with CO2 insufflation  A four quadrant transversus abdominis plane block was performed under direct laparoscopic vision  After this was completed four additional trocars were placed: a 12 mm in the right upper quadrant subcostal position in the anterior axillary line, a 12-mm port was placed in the right flank midclavicular line, a 12-mm port was placed in the left upper quadrant subcostal position in the midclavicular line and another 12-mm port was placed in the left quadrant anterior axillary line lateral to the supraumbilical port  With the trocars in place, the dissection was begun  The omentum of the transverse colon was identified and elevated, this allowed for the ligament of Treitz to be visualized  The small bowel was run about 60 cm to a point distal from the ligament of Treitz and was divided with a stapler and a 60 mm cartridge  The Gisselle limb was then measured at 100 cm, and the 100 cm pia was brought in side-to-side opposition to the biliopancreatic limb   A side-to-side jejunojejunostomy was then created  This was accomplished by first making an antimesenteric enterotomy with cautery energy device  We then positioned the laparoscopic stapler with a 60-mm cartridge within the lumen of the bowel to create a stapled side-to-side anastomosis  The enterotomy was then approximated with a 2-0 silk suture, subsequently elevated and the stapler was then reloaded and positioned perpendicularly to the first staple lines, just below the margin of the enterotomy on the antimesenteric border of the bowel and closed transversely utilizing an additional 60-mm cartridge  The resulting mesenteric defect was then closed with a running nonabsorbable suture  A Brolin stitch was placed to prevent kinking  At this point we repositioned the patient into a reverse Trendelenburg and the [facility] liver retractor was placed in the subxiphoid position through the use of a 5-mm trocar incision  We then turned our attention to the gastroesophageal junction  The left roger was skeletonized dissecting at the angle of His  The pars flaccida was identified and incised  The lesser sac was entered below the lesser curve at the level just inferior to the take off of the left gastric artery  The left gastric artery and hepatic vagal branches were preserved  We then created a 30 cc gastric pouch  To accomplish this, serial firings of a laparoscopic stapler 60-mm cartridge were utilized  The staple lines were reinforced with a butressing material  We created a pouch based on the lesser curve and in vertical orientation  This was accomplished by a  transverse firing of the stapler along the inferior edge of the pouch and then vertical serial firings of the stapler to the angle of His  This completely  the pouch from the gastric remnant  A 25 mm circular stapler anvil was passed through the mouth and into the esophagus and subsequently placed within the pouch    The inferior edge of the pouch was then opened with cautery and the anvil stem was brought through the anterior aspect of the pouch close to the staple line  We proceeded to divide the omentum all the way to the transverse colon  The end of the Gisselle limb was opened with the cautery and the circular stapling device was brought through the dilated left upper quadrant 12-mm port site, and passed through the open end of the Gisselle limb  The Gisselle limb was then passed in a antecolic and antegastric position to the pouch  This was accomplished without tension and without twist   The stem of the stapling device was then brought through the antimesenteric border of the Gisselle limb adjacent to the pouch  The stem and the anvil were united and the stapler was fired  This created an end-to-side gastrojejunostomy  The excess Gisselle limb proximal to the anastomosis was then resected with the cautery energy device and a laparoscopic stapler with a 60-mm cartridge  The anastomosis was reinforced with interrupted absorbable sutures at the intersection of the staple lines  The distal Gisselle limb was occluded and an EGD as well as an air insufflation test were performed  No intraoperative bleeding nor leaks were detected  I then covered the G-J anastomosis with a tongue of omentum in a Vick patch fashion and secured it in place with a single 2/0 Vicryl stitch  The sponge, needle and instrument count was reported complete  The 12-mm port site on the left flank that was dilated for the circular stapler as well as the Visiport trocar were then closed with the use of a suture closure device and a 0 absorbable suture  The liver retractor was removed under direct laparoscopic visualization, and no bleeding was noted  The remaining ports were then also removed under laparoscopic visualization  The skin incisions were all closed with 4-0 absorbable subcuticular suture   The patient tolerated the procedure well, was extubated uneventfully and was transferred to the recovery room in stable condition  I was present for the entire length of the procedure as the attending of record  No qualified resident was available to assist   The presence of an assistant was necessary for camera holding, traction and counter traction and for help with suturing and stapling in addition to performing the intraop-EGD        Patient Disposition:    PACU     Signature: Vicky Hawkins MD  Date: January 16, 2023  Time: 9:36 AM

## 2023-01-16 NOTE — ANESTHESIA PREPROCEDURE EVALUATION
Procedure:  BYPASS GASTRIC  R-N-Y LAP PSB SLEEVE GASTRECTOMY (Abdomen)    Relevant Problems   CARDIO   (+) Essential hypertension   (+) Migraine without aura and without status migrainosus, not intractable   (+) Mixed hyperlipidemia      ENDO   (+) Diabetes mellitus type 2 in obese (HCC)   (+) Type 2 diabetes mellitus without complication, without long-term current use of insulin (HCC)      MUSCULOSKELETAL   (+) Arthritis   (+) Osteoarthritis of both knees      NEURO/PSYCH   (+) Migraine without aura and without status migrainosus, not intractable      Other   (+) COVID-19 long hauler manifesting chronic dyspnea   (+) Obesity        Physical Exam    Airway    Mallampati score: III         Dental       Cardiovascular  Rhythm: regular, Rate: normal,     Pulmonary  Decreased breath sounds,     Other Findings        Anesthesia Plan  ASA Score- 3     Anesthesia Type- general with ASA Monitors  Additional Monitors:   Airway Plan:     Comment: Late note [facility]): 268 glucose after carb drink  6 U regular insulin IV ordered with repeat glucose 30 minutes thereafter          Plan Factors-Exercise tolerance (METS): <4 METS  Chart reviewed  EKG reviewed  Existing labs reviewed  Patient summary reviewed  Patient is not a current smoker  Patient not instructed to abstain from smoking on day of procedure  Patient did not smoke on day of surgery  Obstructive sleep apnea risk education given perioperatively  Induction- intravenous  Postoperative Plan- Plan for postoperative opioid use  Informed Consent- Anesthetic plan and risks discussed with patient

## 2023-01-16 NOTE — ANESTHESIA POSTPROCEDURE EVALUATION
Post-Op Assessment Note    CV Status:  Stable  Pain Score: 5    Pain management: adequate  Multimodal analgesia used between 6 hours prior to anesthesia start to PACU discharge    Mental Status:  Alert and awake   Hydration Status:  Euvolemic   PONV Controlled:  Controlled   Airway Patency:  Patent   Two or more mitigation strategies used for obstructive sleep apnea   Post Op Vitals Reviewed: Yes      Staff: Anesthesiologist, CRNA         No notable events documented      BP      Temp      Pulse     Resp      SpO2      /74 (BP Location: Left arm)   Pulse 85   Temp 97 8 °F (36 6 °C) (Oral)   Resp 16   Ht 5' (1 524 m)   Wt 102 kg (224 lb 6 9 oz)   LMP 01/03/2023 (Exact Date)   SpO2 94%   BMI 43 83 kg/m²

## 2023-01-17 VITALS
WEIGHT: 224.43 LBS | HEIGHT: 60 IN | BODY MASS INDEX: 44.06 KG/M2 | HEART RATE: 78 BPM | SYSTOLIC BLOOD PRESSURE: 117 MMHG | TEMPERATURE: 98.3 F | DIASTOLIC BLOOD PRESSURE: 74 MMHG | RESPIRATION RATE: 17 BRPM | OXYGEN SATURATION: 96 %

## 2023-01-17 LAB
ANION GAP SERPL CALCULATED.3IONS-SCNC: 10 MMOL/L (ref 4–13)
BUN SERPL-MCNC: 11 MG/DL (ref 5–25)
CALCIUM SERPL-MCNC: 8.2 MG/DL (ref 8.3–10.1)
CHLORIDE SERPL-SCNC: 106 MMOL/L (ref 96–108)
CO2 SERPL-SCNC: 23 MMOL/L (ref 21–32)
CREAT SERPL-MCNC: 0.71 MG/DL (ref 0.6–1.3)
ERYTHROCYTE [DISTWIDTH] IN BLOOD BY AUTOMATED COUNT: 14.5 % (ref 11.6–15.1)
GFR SERPL CREATININE-BSD FRML MDRD: 101 ML/MIN/1.73SQ M
GLUCOSE SERPL-MCNC: 132 MG/DL (ref 65–140)
GLUCOSE SERPL-MCNC: 159 MG/DL (ref 65–140)
GLUCOSE SERPL-MCNC: 88 MG/DL (ref 65–140)
HCT VFR BLD AUTO: 33 % (ref 34.8–46.1)
HGB BLD-MCNC: 10.7 G/DL (ref 11.5–15.4)
MCH RBC QN AUTO: 29.2 PG (ref 26.8–34.3)
MCHC RBC AUTO-ENTMCNC: 32.4 G/DL (ref 31.4–37.4)
MCV RBC AUTO: 90 FL (ref 82–98)
PLATELET # BLD AUTO: 218 THOUSANDS/UL (ref 149–390)
PMV BLD AUTO: 11.2 FL (ref 8.9–12.7)
POTASSIUM SERPL-SCNC: 3.5 MMOL/L (ref 3.5–5.3)
RBC # BLD AUTO: 3.67 MILLION/UL (ref 3.81–5.12)
SODIUM SERPL-SCNC: 139 MMOL/L (ref 135–147)
WBC # BLD AUTO: 12.1 THOUSAND/UL (ref 4.31–10.16)

## 2023-01-17 RX ORDER — ACETAMINOPHEN 160 MG/5ML
975 SUSPENSION, ORAL (FINAL DOSE FORM) ORAL EVERY 8 HOURS
Qty: 638.4 ML | Refills: 0 | Status: SHIPPED | OUTPATIENT
Start: 2023-01-17 | End: 2023-01-24

## 2023-01-17 RX ADMIN — ACETAMINOPHEN 975 MG: 325 SUSPENSION ORAL at 07:21

## 2023-01-17 RX ADMIN — SODIUM CHLORIDE, SODIUM LACTATE, POTASSIUM CHLORIDE, AND CALCIUM CHLORIDE 100 ML/HR: 600; 310; 30; 20 INJECTION, SOLUTION INTRAVENOUS at 03:50

## 2023-01-17 RX ADMIN — OXYCODONE HYDROCHLORIDE 5 MG: 5 SOLUTION ORAL at 01:26

## 2023-01-17 RX ADMIN — SIMETHICONE 80 MG: 80 TABLET, CHEWABLE ORAL at 00:23

## 2023-01-17 RX ADMIN — FAMOTIDINE 20 MG: 10 INJECTION, SOLUTION INTRAVENOUS at 08:01

## 2023-01-17 RX ADMIN — METRONIDAZOLE 500 MG: 500 INJECTION, SOLUTION INTRAVENOUS at 01:03

## 2023-01-17 RX ADMIN — Medication 2 SPRAY: at 07:17

## 2023-01-17 RX ADMIN — TOPIRAMATE 25 MG: 25 TABLET, FILM COATED ORAL at 08:01

## 2023-01-17 RX ADMIN — OXYCODONE HYDROCHLORIDE 10 MG: 5 SOLUTION ORAL at 13:23

## 2023-01-17 RX ADMIN — CEFAZOLIN SODIUM 2000 MG: 2 SOLUTION INTRAVENOUS at 00:18

## 2023-01-17 RX ADMIN — ONDANSETRON 4 MG: 2 INJECTION INTRAMUSCULAR; INTRAVENOUS at 01:06

## 2023-01-17 NOTE — CONSULTS
Inpatient Medical Consultation - [facility]    Patient Information: Freida Roy 49 y o  female MRN: [id number]  Unit/Bed#: E5 -01 Encounter: [id number]  PCP: Shorty Presley DO  Date of Admission:  1/16/2023  Date of Consultation: 01/17/23  Requesting Physician: Leeroy Knox MD    Reason For Consultation:   Medical Management    Assessment/Plan:    · Morbid obesity /BMI 43 83: Postoperative day #1  Had elective laparoscopic Gisselle-en-Y gastric bypass by Dr Carroll Pimentel on 1/16/2023  Pain management per primary team, bariatrics  Counseled on need to avoid NSAIDs    · Essential hypertension: Home regimen HCTZ 25 mg daily, lisinopril 20 mg daily  Advised to stop both medications  Patient should follow-up with primary care provider in 1 week for blood pressure check  Patient was advised to keep blood pressure log and take this with her to PCP follow-up  · Type 2 diabetes: Home regimen of oral metformin  Advised to discontinue this medication  PCP follow-up for ongoing care  · History of migraines: Maintained on Topamax daily  Uses as needed Maxalt's as well    · Asthma: maintained on pulmicort and prn albuterol  Stable, no acute exacerbation    · Hyperlipidemia: Maintained on fenofibrate      VTE Prophylaxis:  Per primary team    Counseling / Coordination of Care Time: 45 minutes  Greater than 50% of total time spent on patient counseling and coordination of care  History of Present Illness:    Freida Roy is a 49 y o  female who is originally admitted to the bariatric service on 1/16/2023 due to inability to lose meaningful sustainable weight loss to conservative measures  Patient presented for elective laparoscopic Gisselle-en-Y gastric bypass with intraoperative EGD by Dr Carroll Pimentel on 1/16/2023  We are consulted for postoperative medical management  Patient is a past medical history of morbid obesity, type 2 diabetes, essential hypertension, hyperlipidemia, migraines  Postoperatively complains of low back pain but attributes this to lying in bed  She was given some Tylenol but this made her feel nauseous  She has had nausea but no vomiting  Urinating without difficulty  Ambulating without difficulty  Denies any chest pain, chest pressure, shortness of breath  Denies alcohol or drug use  Review of Systems:    Review of Systems   Constitutional: Negative for chills and fever  HENT: Negative for congestion  Respiratory: Negative for shortness of breath  Cardiovascular: Negative for chest pain, palpitations and leg swelling  Gastrointestinal: Positive for abdominal pain and nausea  Negative for vomiting  Genitourinary: Negative for dysuria  Musculoskeletal: Negative for arthralgias  Neurological: Negative for dizziness  Psychiatric/Behavioral: Negative for agitation  Past Medical and Surgical History:     Past Medical History:   Diagnosis Date   • Arthritis    • Asthma     Allergic   • Bell's palsy     2013   • Diabetes mellitus ([facility] [address] )    • GERD (gastroesophageal reflux disease)    • Hypercholesteremia    • Hypertension    • Migraines    • Seasonal allergies        Past Surgical History:   Procedure Laterality Date   • COSMETIC SURGERY      Face-birth pia removal   • DILATION AND CURETTAGE OF UTERUS     • EGD     • CT LAPS GSTR RSTCV PX W/BYP BALJINDER-EN-Y LIMB <150 CM N/A 1/16/2023    Procedure: BYPASS GASTRIC  R-N-Y LAP , intra-op  EGD;  Surgeon: Swapna Daly MD;  Location: [facility];  Service: Bariatrics   • WISDOM TOOTH EXTRACTION         Meds/Allergies:    all medications and allergies reviewed    Allergies: Allergies   Allergen Reactions   • Simvastatin Other (See Comments)     Other reaction(s):  MUSCLE SPASMS   • Latex Rash       Social History:     Marital Status:     Substance Use History:   Social History     Substance and Sexual Activity   Alcohol Use Yes    Comment: socially/rare     Social History     Tobacco Use   Smoking Status Never   Smokeless Tobacco Never     Social History     Substance and Sexual Activity   Drug Use Never       Family History:    non-contributory    Physical Exam:     Vitals:   Blood Pressure: 113/68 (01/17/23 0808)  Pulse: 96 (01/17/23 0808)  Temperature: 98 3 °F (36 8 °C) (01/17/23 0808)  Temp Source: Oral (01/17/23 6924)  Respirations: 18 (01/17/23 0808)  Height: 5' (152 4 cm) (01/16/23 4991)  Weight - Scale: 102 kg (224 lb 6 9 oz) (01/16/23 0676)  SpO2: 93 % (01/17/23 0439)    Physical Exam  Vitals and nursing note reviewed  Constitutional:       General: She is not in acute distress  Appearance: She is obese  She is not ill-appearing, toxic-appearing or diaphoretic  HENT:      Head: Normocephalic and atraumatic  Eyes:      General: No scleral icterus  Cardiovascular:      Rate and Rhythm: Normal rate and regular rhythm  Pulmonary:      Effort: Pulmonary effort is normal  No respiratory distress  Breath sounds: Normal breath sounds  No stridor  No wheezing or rhonchi  Abdominal:      General: Bowel sounds are normal       Palpations: Abdomen is soft  There is no mass  Hernia: No hernia is present  Comments: abdominal incisions clean dry and intact    Musculoskeletal:         General: No swelling  Cervical back: Neck supple  Skin:     General: Skin is warm and dry  Neurological:      Mental Status: She is alert and oriented to person, place, and time  Mental status is at baseline  Psychiatric:         Mood and Affect: Mood normal          Behavior: Behavior normal        Additional Data:    Lab Results: I have personally reviewed pertinent reports        Results from last 7 days   Lab Units 01/17/23  0544   WBC Thousand/uL 12 10*   HEMOGLOBIN g/dL 10 7*   HEMATOCRIT % 33 0*   PLATELETS Thousands/uL 218     Results from last 7 days   Lab Units 01/17/23  0544   POTASSIUM mmol/L 3 5   CHLORIDE mmol/L 106   CO2 mmol/L 23   BUN mg/dL 11   CREATININE mg/dL 0 71   CALCIUM mg/dL 8 2*           Imaging: I have personally reviewed pertinent reports  No results found  ** Please Note: This note has been constructed using a voice recognition system   **

## 2023-01-17 NOTE — UTILIZATION REVIEW
Initial Clinical Review    Elective    Ip    surgical procedure    Age/Sex: 49 y o  female     Surgery Date:    1/16/23    1  Procedure: Laparoscopic Gisselle-en-Y Gastric Bypass  Intraoperative Endoscopy    Anesthesia:    general    Operative Findings:    normal    POD#1 Progress Note:   1/17    Continue post op care  Monitor labs, hemoglobin  10 7  Today  Encourage  Ambulation  Continue pain  Control/antiemetics as  Needed       Admission Orders: Date/Time/Statement:   Admission Orders (From admission, onward)     Ordered        01/16/23 0912  Inpatient Admission  Once                      Orders Placed This Encounter   Procedures   • Inpatient Admission     Standing Status:   Standing     Number of Occurrences:   1     Order Specific Question:   Level of Care     Answer:   Med Surg [16]     Order Specific Question:   Estimated length of stay     Answer:   Inpatient Only Surgery     Vital Signs: /68 (BP Location: Right arm)   Pulse 96   Temp 98 3 °F (36 8 °C) (Oral)   Resp 18   Ht 5' (1 524 m)   Wt 102 kg (224 lb 6 9 oz)   LMP 01/03/2023 (Exact Date)   SpO2 93%   BMI 43 83 kg/m²     Pertinent Labs/Diagnostic Test Results:     Results from last 7 days   Lab Units 01/16/23  0609   SARS-COV-2  Negative     Results from last 7 days   Lab Units 01/17/23  0544   WBC Thousand/uL 12 10*   HEMOGLOBIN g/dL 10 7*   HEMATOCRIT % 33 0*   PLATELETS Thousands/uL 218         Results from last 7 days   Lab Units 01/17/23  0544   SODIUM mmol/L 139   POTASSIUM mmol/L 3 5   CHLORIDE mmol/L 106   CO2 mmol/L 23   ANION GAP mmol/L 10   BUN mg/dL 11   CREATININE mg/dL 0 71   EGFR ml/min/1 73sq m 101   CALCIUM mg/dL 8 2*         Results from last 7 days   Lab Units 01/17/23  0114 01/16/23  1004 01/16/23  0809 01/16/23  0655   POC GLUCOSE mg/dl 159* 116 88 268*     Results from last 7 days   Lab Units 01/17/23  0544   GLUCOSE RANDOM mg/dL 132           Results from last 7 days   Lab Units 01/16/23  0609   INFLUENZA A PCR Negative   INFLUENZA B PCR  Negative   RSV PCR  Negative             Diet:    Bariatric  Cl liq    Mobility:    OOB as  tolerated    DVT Prophylaxis:   SCD'S    Medications/Pain Control:   Scheduled Medications:  acetaminophen, 975 mg, Oral, Q8H   Or  acetaminophen, 975 mg, Oral, Q8H  famotidine, 20 mg, Intravenous, BID  scopolamine, 1 patch, Transdermal, Once  topiramate, 25 mg, Oral, Daily      Continuous IV Infusions:     PRN Meds:  albuterol, 2 puff, Inhalation, Q4H PRN  diphenhydrAMINE, 25 mg, Oral, Q8H PRN  metoclopramide, 10 mg, Intravenous, Q6H PRN  morphine injection, 4 mg, Intravenous, Q4H PRN  ondansetron, 4 mg, Intravenous, Q6H PRN  oxyCODONE, 10 mg, Oral, Q4H PRN  oxyCODONE, 5 mg, Oral, Q4H PRN  phenol, 2 spray, Mouth/Throat, Q2H PRN  promethazine, 25 mg, Intravenous, Q6H PRN  simethicone, 80 mg, Oral, 4x Daily PRN        Network Utilization Review Department  ATTENTION: Please call with any questions or concerns to [phone] and carefully listen to the prompts so that you are directed to the right person  All voicemails are confidential   Janak Oar all requests for admission clinical reviews, approved or denied determinations and any other requests to dedicated fax number below belonging to the campus where the patient is receiving treatment   List of dedicated fax numbers for the Facilities:  [address] DENIALS (Administrative/Medical Necessity) [phone]   [address] (Maternity/NICU/Pediatrics) [phone]   [address] [phone]   [facility] [phone]   [facility] [phone]   [facility] [address] [place] [id number] [place] [address] [address] [facility] [phone]   [address] [phone]

## 2023-01-17 NOTE — DISCHARGE SUMMARY
Discharge Summary - Maury Lamb 49 y o  female MRN: [id number]    Unit/Bed#: E5 -01 Encounter: [id number]      Pre-Operative Diagnosis: Pre-Op Diagnosis Codes:     * Morbid obesity ([facility] [address] ) [E66 01]     * Severe diabetes mellitus ([facility] [address] ) [E11 9]     * Essential hypertension, benign [I10]     * Hyperlipidemia, unspecified hyperlipidemia type [E78 5]    Post-Operative Diagnosis: Post-Op Diagnosis Codes:     * Morbid obesity ([facility] ) [E66 01]     * [facility] [E11 9]     * Essential hypertension, benign [I10]     * Hyperlipidemia, unspecified hyperlipidemia type [E78 5]    Procedures Performed:  Procedure(s):  BYPASS GASTRIC  R-N-Y LAP , intra-op  EGD    Surgeon: Babar Jackson MD    See H & P for full details of admission and Operative Note for full details of operations performed  Patient tolerated surgery well without complications  In the morning postoperative Day 1, the patient had mild nausea and abdominal pain  Tolerated a clear liquid diet without vomiting  Able to ambulate and voiding independently  Patient was deemed ready for discharge home  SLIM consulted for home medication management  Patient was seen and examined prior to discharge  Provisions for Follow-Up Care:  See After Visit Summary/Discharge Instructions for information related to follow-up care and home orders  Disposition: Home, in stable condition  Planned Readmission: No    Discharge Medications:  See After Visit Summary/Discharge Instructions for reconciled discharge medications provided to patient and family  Post Operative instructions: Reviewed with patient and/or family      Signature:   Epi Motta PA-C  Date: 1/17/2023 Time: 2:28 PM

## 2023-01-17 NOTE — DISCHARGE INSTRUCTIONS
your medications from [address] in [facility] [address] or cut your pills and open capsules, mix with liquid to drink  Take Tylenol every 8 hours around the clock, unless instructed otherwise  Take your omeprazole daily  It is important to stay hydrated and follow your discharge diet progression   Mild nausea is ok as long as you can drink fluids, sip very slowly and get up and walk during any periods of nausea  You may shower normally after 48 hours, but do not scrub incision sites, blot gently with clean towel to dry incisions  Take home medications as usual unless instructed otherwise while in hospital  Follow up with Dr Heri Shepherd and your PCP within the next week  Sleeve gastrectomy patients ONLY: Complete full course of lovenox injections!

## 2023-01-17 NOTE — DISCHARGE INSTR - AVS FIRST PAGE
Bariatric/Weight Loss Surgery  Hospital Discharge Instructions  ACTIVITY:  Progress as feels comfortable - a good rule is:  if you are doing something and it begins to hurt, stop doing the activity  Walk every hour while at home  You may walk stairs if you do so slowly  You may shower 48 hours after surgery  Do not scrub incision sites  Blot gently with clean towel to dry incisions  (see #4 below)   Use your incentive spirometer 10 times per hour while awake for 1 week after surgery  Do NOT drive for 48 hours after surgery  No driving 24 hours after taking certain prescription pain medications  Examples of such medication are Percocet, Darvocet, Oxycodone, Tylenol #3, and Tylenol with Codeine  DIET  Stay on a liquid diet for 7 days after your surgery date, sipping slowly  Refer to your manual for examples of choices  Remember to keep your liquids sugar free or low calorie  You may have protein drinks  Make sure to drink 48 to 64 ounces per day of fluids  You may advance to a pureed diet one week after surgery as instructed by your diet progression pamphlet  Once you get approval from your surgeon at your first post operative visit, you may advance to the soft diet and remain on soft diet for 8 weeks unless otherwise instructed  MEDICATIONS:  The abdominal nerve block will wear off during the first 1-2 days that you are home, and you may become sore (especially over incision site/sites where abdominal wall is sutured)  This may create a pulling sensation, especially while moving around, and will fade over time  Continue to take your Tylenol and your pain medication as instructed  Start vitamins and minerals one week after surgery or when you start stage 3/puree diet  Anti-acid Medication as per prescription  Other medications as indicated on the Physician Patient Discharge Instructions form given to you at the time of discharge    Make sure that you are splitting your pill or tablet medications in halves or fourths or even crushing them before you take them  Capsules should be opened and mixed with water or jello  You need to do this for at least 4 weeks after surgery  Eventually you will be able to take your medications the regular way as they were prescribed  You will need to consult with your Family Doctor in regards to all your prescribed medication, particularly those for blood pressure and diabetes  As you lose weight, medical conditions may change, requiring an alteration or elimination of the drug dose  Monitor blood pressure closely and call PCP with any concerns  Sleeve Gastrectomy patients ONLY:  Complete full course of lovenox injections! DO NOT TAKE BIRTH CONTROL(BC) MEDICATIONS, INSERT BC VAGINAL RINGS, OR PLACE IUD OR ANY OTHER BC METHODS UNTIL 31 DAYS FROM DAY OF DISCHARGE FROM HOSPITAL  THIS PLACES YOU AT HIGH RISK FOR A POTENTIALLY LIFE THREATENING BLOOD CLOT  Remember to always use barrier methods for birth control and speak to your GYN about using two forms of birth control to start 31 days after surgery  It is very important to avoid pregnancy until at least 18-24 months after surgery  INCISION CARE  You may shower and get incisions wet 2 days after surgery  No soaking tub baths or swimming for 30 days after surgery  Keep abdominal area and incisions clean  Use soap and water to create a good lather and rinse off  Do not scrub incisions  If you have a drain, empty the drain as the nurses instructed  FOLLOW-UP APPOINTMENT should be made for one week after discharge  Call surgeon’s office at [phone] to schedule an appointment      CALL YOUR DOCTOR FOR:  pain not controlled by pain medications, a temperature greater than 101 5° F, any increase or change in drainage or redness from any incision, any vomiting or inability to keep liquids down, shortness of breath, shoulder pain, or bleeding

## 2023-01-17 NOTE — PROGRESS NOTES
Progress Note - Bariatric Surgery   Gurpreet Price 49 y o  female MRN: [id number]  Unit/Bed#: E5 -01 Encounter: [id number]      Subjective/Objective     Subjective:  Patient seen and valuated this morning at bedside  Patient is  POD1 s/p Laparoscopic Gisselle-En-Y Gastric Bypass  Patient denies fevers, chills, sweats, SOB, CP, calf pain  Pain adequately controlled on oral pain medication  Ambulating without assistance, voiding well, and using incentive spirometer  Patient tolerating liquid diet without nausea or vomiting today  Vital signs stable    Objective:    /68 (BP Location: Right arm)   Pulse 96   Temp 98 3 °F (36 8 °C) (Oral)   Resp 18   Ht 5' (1 524 m)   Wt 102 kg (224 lb 6 9 oz)   LMP 01/03/2023 (Exact Date)   SpO2 93%   BMI 43 83 kg/m²       Intake/Output Summary (Last 24 hours) at 1/17/2023 1000  Last data filed at 1/16/2023 2219  Gross per 24 hour   Intake 410 ml   Output --   Net 410 ml       Invasive Devices     Peripheral Intravenous Line  Duration           Peripheral IV 01/16/23 Dorsal (posterior); Right Hand 1 day                ROS: 10-point system completed  All negative except see HPI  Physical Exam    General Appearance:    Alert, cooperative, no distress, appears stated age   Head:    Normocephalic, without obvious abnormality, atraumatic   Lungs:     Respirations unlabored   Heart:    Regular rate and rhythm   Abdomen:     Soft, appropriate tenderness, no masses, no organomegaly, non-distended   Extremities:   Extremities normal, atraumatic, no cyanosis or edema   Neurologic:  Incision:  Psych:   Normal strength and sensation    Clean, dry, and intact    Normal mood and affect       Lab, Imaging and other studies:  I have personally reviewed pertinent lab results    , CBC:   Lab Results   Component Value Date    WBC 12 10 (H) 01/17/2023    HGB 10 7 (L) 01/17/2023    HCT 33 0 (L) 01/17/2023    MCV 90 01/17/2023     01/17/2023    MCH 29 2 01/17/2023    MCHC 32 4 01/17/2023    RDW 14 5 01/17/2023    MPV 11 2 01/17/2023   , CMP:   Lab Results   Component Value Date    SODIUM 139 01/17/2023    K 3 5 01/17/2023     01/17/2023    CO2 23 01/17/2023    BUN 11 01/17/2023    CREATININE 0 71 01/17/2023    CALCIUM 8 2 (L) 01/17/2023    EGFR 101 01/17/2023        VTE Mechanical Prophylaxis: sequential compression device    Assessment/Plan  1)  Patient with Morbid Obesity s/p Laparoscopic Gisselle-En-Y Gastric Bypass with stable post op course  Patient afebrile and hemodynamically stable  - Encourage PO fluids  - Recommend ambulation, use of SCDs when not ambulating, and incentive spirometry  - Multimodal pain control  - Complete antibiotic course  - SLIM consult pending  - Plan to D/C patient home today pending anticipated progression    Plan of care was discussed with patient    Care plan discussed with Dr Justine Ziegler DO  Bariatric Surgery Fellow  1/17/2023  10:00 AM

## 2023-01-18 ENCOUNTER — TELEPHONE (OUTPATIENT)
Dept: MEDSURG UNIT | Facility: HOSPITAL | Age: 48
End: 2023-01-18

## 2023-01-18 NOTE — TELEPHONE ENCOUNTER
Post op follow up phone call completed  Pt is sipping liquids but has only consumed about 20 since last evening around 9 pm   Pt reported having a moist cough  Informed her this is from anesthesia and one of the reasons the use of the spirometer is stressed  Using IS as instructed, reinforced importance of using IS to help prevent pneumonia  Ambulating about home without difficulty  Pain controlled with analgesia  Reaffirmed examples of liquid diet over the next week  Pt reported she does not like some of the protein drinks she liked before surgery  Informed pt most important is maintaining hydration and if protein drinks are not being tolerated to concentrate on the fluids that are tolerated  Did not start miralax but will tonight, passing gas, no BM thus far  Pt stated understanding about discharge instructions and medication adjustments  Follow up appt with surgeon scheduled for next week  Instructed to call with any additional questions or concerns

## 2023-01-18 NOTE — UTILIZATION REVIEW
NOTIFICATION OF INPATIENT ADMISSION   AUTHORIZATION REQUEST   SERVICING FACILITY:   [address] [place] [address], [facility], [address] [facility]  Tax ID: [id number]  NPI: [id number] ATTENDING PROVIDER:  Attending Name and NPI#: Nataly Brown [[id number]]  Address: [address] [place] [address], [facility] [address] [facility]  Phone: [phone]     ADMISSION INFORMATION:  Place of Service: [address] [place] [address] Code: 21  Inpatient Admission Date/Time: 1/16/23  9:12 AM  Discharge Date/Time: 1/17/2023  4:10 PM  Admitting Diagnosis Code/Description:  Morbid obesity ([address] ) [E66 01]  Severe diabetes mellitus ([facility] ) [E11 9]  Essential hypertension, benign [I10]  Hyperlipidemia, unspecified hyperlipidemia type [E78 5]     UTILIZATION REVIEW CONTACT:  Nabil Fajardo Utilization   [facility] Utilization Review Department  Phone: [phone]  Fax: [phone]  Email: [email or web address]  Contact for approvals/pending authorizations, clinical reviews, and discharge  PHYSICIAN ADVISORY SERVICES:  Medical Necessity Denial & [employer] Review  Phone: [phone]  Fax: [phone]  Email: [email or web address]  [address], PA-C  Physician Assistant  Bariatrics  Discharge Summary     Attested  Date of Service:  1/17/2023  2:28 PM     Attested            Attestation signed by Yash Turner MD at 1/17/2023  2:45 PM     Supervising Physician Statement     I have seen and examined the patient myself  I have discussed the patient with the PA  I agree with the PA's documented findings and plan of care  Clinically stable and improved  Tolerating oral intake without difficulty    Follow-up in the office in 1 week as scheduled   Tete Bejarano MD  1/17/2023  2:45 PM                    Discharge Summary - Sundargordon Hull 49 y o  female MRN: [id number]     Unit/Bed#: E5 -01 Encounter: [id number]        Pre-Operative Diagnosis: Pre-Op Diagnosis Codes:     * Morbid obesity ([facility] ) [E66 01]     * Severe diabetes mellitus ([facility] ) [E11 9]     * Essential hypertension, benign [I10]     * Hyperlipidemia, unspecified hyperlipidemia type [E78 5]     Post-Operative Diagnosis: Post-Op Diagnosis Codes:     * Morbid obesity ([facility] ) [E66 01]     * Severe diabetes mellitus ([facility] ) [E11 9]     * Essential hypertension, benign [I10]     * Hyperlipidemia, unspecified hyperlipidemia type [E78 5]     Procedures Performed:  Procedure(s):  BYPASS GASTRIC  R-N-Y LAP , intra-op  EGD     Surgeon: Turner Rodriguez MD     See H & P for full details of admission and Operative Note for full details of operations performed       Patient tolerated surgery well without complications  In the morning postoperative Day 1, the patient had mild nausea and abdominal pain  Tolerated a clear liquid diet without vomiting  Able to ambulate and voiding independently  Patient was deemed ready for discharge home   SLIM consulted for home medication management       Patient was seen and examined prior to discharge        Provisions for Follow-Up Care:  See After Visit Summary/Discharge Instructions for information related to follow-up care and home orders        Disposition: Home, in stable condition       Planned Readmission: No     Discharge Medications:  See After Visit Summary/Discharge Instructions for reconciled discharge medications provided to patient and family        Post Operative instructions: Reviewed with patient and/or family      Signature:   Claude Caro PA-C  Date: 1/17/2023 Time: 2:28 PM                      Cosigned by: Turner Rodriguez MD at 1/17/2023  2:45 PM     Revision History

## 2023-01-26 ENCOUNTER — OFFICE VISIT (OUTPATIENT)
Dept: BARIATRICS | Facility: CLINIC | Age: 48
End: 2023-01-26

## 2023-01-26 VITALS
HEIGHT: 60 IN | HEART RATE: 84 BPM | SYSTOLIC BLOOD PRESSURE: 120 MMHG | TEMPERATURE: 96.9 F | DIASTOLIC BLOOD PRESSURE: 80 MMHG | WEIGHT: 215.5 LBS | BODY MASS INDEX: 42.31 KG/M2

## 2023-01-26 DIAGNOSIS — Z48.815 ENCOUNTER FOR SURGICAL AFTERCARE FOLLOWING SURGERY OF DIGESTIVE SYSTEM: Primary | ICD-10-CM

## 2023-01-26 DIAGNOSIS — Z98.84 BARIATRIC SURGERY STATUS: Primary | ICD-10-CM

## 2023-01-26 RX ORDER — ACETAMINOPHEN 160 MG/5ML
SUSPENSION ORAL
COMMUNITY
Start: 2023-01-17

## 2023-01-26 NOTE — PROGRESS NOTES
POST OP UP VISIT - BARIATRIC SURGERY  Guillermina Ramos 49 y o  female MRN: [id number]  Unit/Bed#:  Encounter: [id number]      HPI:  Guillermina Ramos is a 49 y o  female status post Laparoscopic RNYGB performed by Dr Nisreen Mcneill on 1/16/23 returning to office for first post op visit since surgery  Tolerating diet  Denies nausea and vomiting  Taking multivitamins and PPI daily  Review of Systems   Constitutional: Negative  Respiratory: Negative  Cardiovascular: Negative  Gastrointestinal: Negative  Historical Information   Past Medical History:   Diagnosis Date   • Arthritis    • Asthma     Allergic   • Bell's palsy     2013   • Diabetes mellitus ([facility] )    • GERD (gastroesophageal reflux disease)    • Hypercholesteremia    • Hypertension    • Migraines    • Seasonal allergies      Past Surgical History:   Procedure Laterality Date   • COSMETIC SURGERY      Face-birth pia removal   • DILATION AND CURETTAGE OF UTERUS     • EGD     • OR LAPS GSTR RSTCV PX W/BYP BALJINDER-EN-Y LIMB <150 CM N/A 1/16/2023    Procedure: BYPASS GASTRIC  R-N-Y LAP , intra-op  EGD;  Surgeon: Nisreen Mcneill MD;  Location: [facility];  Service: Bariatrics   • WISDOM TOOTH EXTRACTION       Social History   Social History     Substance and Sexual Activity   Alcohol Use Yes    Comment: socially/rare     Social History     Substance and Sexual Activity   Drug Use Never     Social History     Tobacco Use   Smoking Status Never   Smokeless Tobacco Never     Family History: non-contributory    Meds/Allergies   all medications and allergies reviewed  Allergies   Allergen Reactions   • Simvastatin Other (See Comments)     Other reaction(s):  MUSCLE SPASMS   • Latex Rash       Objective       Current Vitals:   Blood Pressure: 120/80 (01/26/23 0937)  Pulse: 84 (01/26/23 0937)  Temperature: (!) 96 9 °F (36 1 °C) (01/26/23 0937)  Temp Source: Tympanic (01/26/23 7843)  Height: 5' (152 4 cm) (01/26/23 4002)  Weight - Scale: 97 8 kg (215 lb 8 oz) (01/26/23 8606)      Invasive Devices     None                 Physical Exam  Constitutional:       Appearance: She is obese  Cardiovascular:      Rate and Rhythm: Normal rate and regular rhythm  Pulmonary:      Effort: Pulmonary effort is normal       Breath sounds: Normal breath sounds  Abdominal:      General: Abdomen is flat  There is no distension  Palpations: Abdomen is soft  Comments: Incisions CDI   Neurological:      Mental Status: She is alert  Assessment/PLAN:    49 y o  female status post Laparoscopic RNYGB done on 1/16/23 by Dr Yash Turner, doing well post op  No major issues and healing well  Increase physical activity slowly as tolerated and instructed  Advance diet as instructed by our dietitians today and as indicated in the binder  Continue PPI    Follow up in one month as scheduled            Jaime Rincon DO  Bariatric Surgery Fellow  [facility]  1/26/2023  9:53 AM

## 2023-01-26 NOTE — PROGRESS NOTES
Weight Management Nutrition Note       Bariatric Surgeon: Dr Shelley Whatley    Surgery: Gastric Bypass Laparoscopic    Class: first post op note    Topics discussed today include:     fluid goals post op, protein goals post op, constipation, chew food well, exercise, diet progression, dumping syndrome, protein supplems, vitamin/mineral supplements, calcium supplements, additional vitamin B12, iron supplements and fat soluble vitamins     Patient was able to verbalize basic diet (protein, fluid, vitamin and mineral) recommendations and possible nutrition-related complications   Yes

## 2023-01-29 ENCOUNTER — TELEPHONE (OUTPATIENT)
Dept: OTHER | Facility: OTHER | Age: 48
End: 2023-01-29

## 2023-01-29 NOTE — TELEPHONE ENCOUNTER
Per patient's phone call, she would like a referral to [facility] physical therapy for Hip pain due to gait change

## 2023-02-08 DIAGNOSIS — E66.01 OBESITY, CLASS III, BMI 40-49.9 (MORBID OBESITY) (HCC): ICD-10-CM

## 2023-02-08 RX ORDER — OMEPRAZOLE 20 MG/1
CAPSULE, DELAYED RELEASE ORAL
Qty: 90 CAPSULE | Refills: 2 | Status: SHIPPED | OUTPATIENT
Start: 2023-02-08

## 2023-02-17 ENCOUNTER — APPOINTMENT (EMERGENCY)
Dept: CT IMAGING | Facility: HOSPITAL | Age: 48
End: 2023-02-17

## 2023-02-17 ENCOUNTER — HOSPITAL ENCOUNTER (EMERGENCY)
Facility: HOSPITAL | Age: 48
Discharge: HOME | End: 2023-02-17
Attending: EMERGENCY MEDICINE | Admitting: EMERGENCY MEDICINE

## 2023-02-17 VITALS
WEIGHT: 207.67 LBS | BODY MASS INDEX: 40.56 KG/M2 | OXYGEN SATURATION: 99 % | TEMPERATURE: 98.1 F | RESPIRATION RATE: 20 BRPM | HEART RATE: 76 BPM | SYSTOLIC BLOOD PRESSURE: 135 MMHG | DIASTOLIC BLOOD PRESSURE: 76 MMHG

## 2023-02-17 DIAGNOSIS — Z98.84 S/P GASTRIC BYPASS: ICD-10-CM

## 2023-02-17 DIAGNOSIS — R10.9 ABDOMINAL PAIN: ICD-10-CM

## 2023-02-17 DIAGNOSIS — N39.0 UTI (URINARY TRACT INFECTION): ICD-10-CM

## 2023-02-17 DIAGNOSIS — K83.8 COMMON BILE DUCT DILATION: ICD-10-CM

## 2023-02-17 DIAGNOSIS — R55 NEAR SYNCOPE: Primary | ICD-10-CM

## 2023-02-17 DIAGNOSIS — D25.9 UTERINE FIBROID: ICD-10-CM

## 2023-02-17 LAB
2HR DELTA HS TROPONIN: 2 NG/L
ALBUMIN SERPL BCP-MCNC: 3.9 G/DL (ref 3.5–5)
ALP SERPL-CCNC: 76 U/L (ref 34–104)
ALT SERPL W P-5'-P-CCNC: 24 U/L (ref 7–52)
ANION GAP SERPL CALCULATED.3IONS-SCNC: 11 MMOL/L (ref 4–13)
AST SERPL W P-5'-P-CCNC: 28 U/L (ref 13–39)
ATRIAL RATE: 71 BPM
ATRIAL RATE: 86 BPM
BACTERIA UR QL AUTO: ABNORMAL /HPF
BASOPHILS # BLD AUTO: 0.05 THOUSANDS/ÂΜL (ref 0–0.1)
BASOPHILS NFR BLD AUTO: 1 % (ref 0–1)
BILIRUB SERPL-MCNC: 0.78 MG/DL (ref 0.2–1)
BILIRUB UR QL STRIP: ABNORMAL
BUN SERPL-MCNC: 11 MG/DL (ref 5–25)
CALCIUM SERPL-MCNC: 9.3 MG/DL (ref 8.4–10.2)
CARDIAC TROPONIN I PNL SERPL HS: 5 NG/L
CARDIAC TROPONIN I PNL SERPL HS: 7 NG/L
CHLORIDE SERPL-SCNC: 104 MMOL/L (ref 96–108)
CLARITY UR: CLEAR
CO2 SERPL-SCNC: 22 MMOL/L (ref 21–32)
COLOR UR: ABNORMAL
CREAT SERPL-MCNC: 0.63 MG/DL (ref 0.6–1.3)
EOSINOPHIL # BLD AUTO: 0.26 THOUSAND/ÂΜL (ref 0–0.61)
EOSINOPHIL NFR BLD AUTO: 4 % (ref 0–6)
ERYTHROCYTE [DISTWIDTH] IN BLOOD BY AUTOMATED COUNT: 15.1 % (ref 11.6–15.1)
EXT PREGNANCY TEST URINE: NEGATIVE
EXT. CONTROL: NORMAL
GFR SERPL CREATININE-BSD FRML MDRD: 106 ML/MIN/1.73SQ M
GLUCOSE SERPL-MCNC: 103 MG/DL (ref 65–140)
GLUCOSE SERPL-MCNC: 99 MG/DL (ref 65–140)
GLUCOSE UR STRIP-MCNC: NEGATIVE MG/DL
HCT VFR BLD AUTO: 39 % (ref 34.8–46.1)
HGB BLD-MCNC: 12.5 G/DL (ref 11.5–15.4)
HGB UR QL STRIP.AUTO: ABNORMAL
IMM GRANULOCYTES # BLD AUTO: 0.02 THOUSAND/UL (ref 0–0.2)
IMM GRANULOCYTES NFR BLD AUTO: 0 % (ref 0–2)
KETONES UR STRIP-MCNC: ABNORMAL MG/DL
LEUKOCYTE ESTERASE UR QL STRIP: NEGATIVE
LYMPHOCYTES # BLD AUTO: 1.3 THOUSANDS/ÂΜL (ref 0.6–4.47)
LYMPHOCYTES NFR BLD AUTO: 18 % (ref 14–44)
MAGNESIUM SERPL-MCNC: 1.9 MG/DL (ref 1.9–2.7)
MCH RBC QN AUTO: 28.8 PG (ref 26.8–34.3)
MCHC RBC AUTO-ENTMCNC: 32.1 G/DL (ref 31.4–37.4)
MCV RBC AUTO: 90 FL (ref 82–98)
MONOCYTES # BLD AUTO: 0.62 THOUSAND/ÂΜL (ref 0.17–1.22)
MONOCYTES NFR BLD AUTO: 9 % (ref 4–12)
MUCOUS THREADS UR QL AUTO: ABNORMAL
NEUTROPHILS # BLD AUTO: 4.85 THOUSANDS/ÂΜL (ref 1.85–7.62)
NEUTS SEG NFR BLD AUTO: 68 % (ref 43–75)
NITRITE UR QL STRIP: NEGATIVE
NON-SQ EPI CELLS URNS QL MICRO: ABNORMAL /HPF
NRBC BLD AUTO-RTO: 0 /100 WBCS
P AXIS: 56 DEGREES
P AXIS: 63 DEGREES
PH UR STRIP.AUTO: 5.5 [PH] (ref 4.5–8)
PLATELET # BLD AUTO: 211 THOUSANDS/UL (ref 149–390)
PMV BLD AUTO: 12.5 FL (ref 8.9–12.7)
POTASSIUM SERPL-SCNC: 3.6 MMOL/L (ref 3.5–5.3)
PR INTERVAL: 134 MS
PR INTERVAL: 140 MS
PROT SERPL-MCNC: 7 G/DL (ref 6.4–8.4)
PROT UR STRIP-MCNC: ABNORMAL MG/DL
QRS AXIS: 68 DEGREES
QRS AXIS: 69 DEGREES
QRSD INTERVAL: 82 MS
QRSD INTERVAL: 82 MS
QT INTERVAL: 366 MS
QT INTERVAL: 390 MS
QTC INTERVAL: 423 MS
QTC INTERVAL: 437 MS
RBC # BLD AUTO: 4.34 MILLION/UL (ref 3.81–5.12)
RBC #/AREA URNS AUTO: ABNORMAL /HPF
SODIUM SERPL-SCNC: 137 MMOL/L (ref 135–147)
SP GR UR STRIP.AUTO: >=1.03 (ref 1–1.03)
T WAVE AXIS: 34 DEGREES
T WAVE AXIS: 38 DEGREES
TSH SERPL DL<=0.05 MIU/L-ACNC: 2.56 UIU/ML (ref 0.45–4.5)
UROBILINOGEN UR QL STRIP.AUTO: 0.2 E.U./DL
VENTRICULAR RATE: 71 BPM
VENTRICULAR RATE: 86 BPM
WBC # BLD AUTO: 7.1 THOUSAND/UL (ref 4.31–10.16)
WBC #/AREA URNS AUTO: ABNORMAL /HPF

## 2023-02-17 RX ORDER — ONDANSETRON 2 MG/ML
4 INJECTION INTRAMUSCULAR; INTRAVENOUS ONCE
Status: COMPLETED | OUTPATIENT
Start: 2023-02-17 | End: 2023-02-17

## 2023-02-17 RX ORDER — CEPHALEXIN 250 MG/1
500 CAPSULE ORAL ONCE
Status: COMPLETED | OUTPATIENT
Start: 2023-02-17 | End: 2023-02-17

## 2023-02-17 RX ORDER — CEPHALEXIN 250 MG/1
500 CAPSULE ORAL EVERY 12 HOURS SCHEDULED
Qty: 28 CAPSULE | Refills: 0 | Status: SHIPPED | OUTPATIENT
Start: 2023-02-17 | End: 2023-02-24

## 2023-02-17 RX ADMIN — ONDANSETRON HYDROCHLORIDE 4 MG: 2 SOLUTION INTRAMUSCULAR; INTRAVENOUS at 13:34

## 2023-02-17 RX ADMIN — IOHEXOL 35 ML: 300 INJECTION, SOLUTION INTRAVENOUS at 15:02

## 2023-02-17 RX ADMIN — IOHEXOL 100 ML: 350 INJECTION, SOLUTION INTRAVENOUS at 15:02

## 2023-02-17 RX ADMIN — SODIUM CHLORIDE 1000 ML: 0.9 INJECTION, SOLUTION INTRAVENOUS at 13:34

## 2023-02-17 RX ADMIN — CEPHALEXIN 500 MG: 250 CAPSULE ORAL at 16:35

## 2023-02-17 NOTE — ED PROVIDER NOTES
History  Chief Complaint   Patient presents with   • Dizziness     Pt was at hospital picking up meds when started with sharp pains in chest   Became dizzy, diaphoretic  Another staff member was in bathroom and helped pt to ground  Pt did not fall or strike head  States pain is resolving  49 YOF with PMH asthma, bell's palsy, DM, GERD, HTN, s/p joyce-en-y gastric bypass surgery 1/16/23 (Dr Jessi Montiel) presents today after medical emergency was called on her in the hospital bathroom  Pt reports today she came to the pharmacy to  protein and while at the pharmacy she began to feel "off"  Reports she developed a mid/left upper pain in her back and abdominal pain as well  States that she then went out to her car and was going to leave but the pain became worse  Came back into the hospital and was in the bathroom due to feeling of nausea and almost vomiting  States while in the bathroom the abdominal pain became very severe and she began to feel as if she was going to pass out  States a nurse was in the bathroom with her and helped pt to the floor  Pt reports she was diaphoretic and was having blurry vision, near syncope but did not lose consciousness  Medical emergency was called and she was brought to the ED  On arrival to the ED pt reports she is still having some mid/left sided upper pain but it is improving  Also reports some abdominal pain but it is also improving  Reports she has been trying to keep up with fluid intake at home but is having some difficulty with that  Also reports that she barely ate anything today  Notes that yesterday she had a decreased appetite and didn't eat much last night  Has been having normal BM and urination  Denies any recent illnesses  Denies fever, chills, chest pain, urinary symptoms  Currently has her period and denies chance of being pregnant  Prior to Admission Medications   Prescriptions Last Dose Informant Patient Reported? Taking?    Calcium Citrate-Vitamin D 500-12 5 MG-MCG PACK   Yes No   Sig: Take 1 tablet by mouth Three times a day   Multiple Vitamin (MULTIVITAMIN ADULT PO)   Yes No   Sig: Take 1 tablet by mouth daily   acetaminophen (TYLENOL) 160 mg/5 mL liquid   Yes No   albuterol (PROVENTIL HFA,VENTOLIN HFA) 90 mcg/act inhaler   Yes No   Sig: TAKE 2 PUFFS BY MOUTH EVERY 6 HOURS AS NEEDED FOR WHEEZE   budesonide (Pulmicort Flexhaler) 90 MCG/ACT inhaler   Yes No   Sig: Inhale 1 puff 2 (two) times a day Rinse mouth after use  fenofibrate micronized (LOFIBRA) 134 MG capsule   Yes No   Sig: Take 134 mg by mouth   fenofibrate micronized (LOFIBRA) 200 MG capsule   Yes No   Sig: Take 200 mg by mouth daily   Patient not taking: Reported on 1/26/2023   omeprazole (PriLOSEC) 20 mg delayed release capsule   No No   Sig: TAKE 1 CAPSULE BY MOUTH EVERY DAY   oxyCODONE (Roxicodone) 5 immediate release tablet   No No   Sig: Take 1 tablet (5 mg total) by mouth every 4 (four) hours as needed for moderate pain Max Daily Amount: 30 mg Do not start before January 17, 2023     Patient not taking: Reported on 1/26/2023   rizatriptan (MAXALT) 5 MG tablet   Yes No   Sig: Take 5 mg by mouth once as needed for migraine May repeat in 2 hours if needed   topiramate (TOPAMAX) 25 mg tablet   Yes No   Sig: Take one to three pills by mouth in the evening as directed w plenty of water      Facility-Administered Medications: None       Past Medical History:   Diagnosis Date   • Arthritis    • Asthma     Allergic   • Bell's palsy     2013   • Diabetes mellitus ([facility] [address] )    • GERD (gastroesophageal reflux disease)    • Hypercholesteremia    • Hypertension    • Migraines    • Seasonal allergies        Past Surgical History:   Procedure Laterality Date   • COSMETIC SURGERY      Face-birth pia removal   • DILATION AND CURETTAGE OF UTERUS     • EGD     • OH LAPS GSTR RSTCV PX W/BYP BALJINDER-EN-Y LIMB <150 CM N/A 1/16/2023    Procedure: BYPASS GASTRIC  R-N-Y LAP , intra-op  EGD;  Surgeon: Theron Adkins MD; Location: [facility] OR;  Service: Bariatrics   • WISDOM TOOTH EXTRACTION         Family History   Problem Relation Age of Onset   • Cancer Mother    • Heart failure Mother    • Diabetes Mother    • Atrial fibrillation Mother    • Hypertension Mother    • Heart attack Father    • Stroke Father    • Hypertension Father    • Kidney disease Father    • Diabetes Father      I have reviewed and agree with the history as documented  E-Cigarette/Vaping   • E-Cigarette Use Never User      E-Cigarette/Vaping Substances   • Nicotine No    • THC No    • CBD No    • Flavoring No    • Other No    • Unknown No      Social History     Tobacco Use   • Smoking status: Never   • Smokeless tobacco: Never   Vaping Use   • Vaping Use: Never used   Substance Use Topics   • Alcohol use: Yes     Comment: socially/rare   • Drug use: Never       Review of Systems   Constitutional: Negative for chills and fever  Respiratory: Negative for shortness of breath  Cardiovascular: Negative for chest pain and palpitations  Gastrointestinal: Positive for abdominal pain and nausea  Negative for diarrhea  Genitourinary: Positive for vaginal bleeding (on her period)  Negative for decreased urine volume  Musculoskeletal: Positive for back pain  Neurological: Positive for dizziness, syncope (near syncope) and light-headedness  Negative for weakness  All other systems reviewed and are negative  Physical Exam  Physical Exam  Vitals and nursing note reviewed  Constitutional:       General: She is not in acute distress  Appearance: Normal appearance  She is well-developed  She is not ill-appearing  HENT:      Head: Normocephalic and atraumatic  Eyes:      Conjunctiva/sclera: Conjunctivae normal    Cardiovascular:      Rate and Rhythm: Normal rate and regular rhythm  Heart sounds: No murmur heard  Pulmonary:      Effort: Pulmonary effort is normal  No respiratory distress  Breath sounds: Normal breath sounds  Abdominal:      Palpations: Abdomen is soft  Tenderness: There is abdominal tenderness (generalized)  Musculoskeletal:         General: Normal range of motion  Cervical back: Normal range of motion and neck supple  Skin:     General: Skin is warm and dry  Capillary Refill: Capillary refill takes less than 2 seconds  Coloration: Skin is pale  Neurological:      General: No focal deficit present  Mental Status: She is alert and oriented to person, place, and time  Cranial Nerves: No cranial nerve deficit  Sensory: No sensory deficit  Motor: No weakness     Psychiatric:         Mood and Affect: Mood normal          Behavior: Behavior normal          Vital Signs  ED Triage Vitals   Temperature Pulse Respirations Blood Pressure SpO2   02/17/23 1302 02/17/23 1302 02/17/23 1302 02/17/23 1302 02/17/23 1302   98 1 °F (36 7 °C) 84 16 132/79 95 %      Temp Source Heart Rate Source Patient Position - Orthostatic VS BP Location FiO2 (%)   02/17/23 1302 02/17/23 1426 02/17/23 1426 02/17/23 1426 --   Oral Monitor Lying Left arm       Pain Score       02/17/23 1302       No Pain           Vitals:    02/17/23 1426 02/17/23 1515 02/17/23 1630 02/17/23 1758   BP: 122/72 140/78 140/78 135/76   Pulse: 71 75 70 76   Patient Position - Orthostatic VS: Lying Lying Lying Lying         Visual Acuity      ED Medications  Medications   ondansetron (ZOFRAN) injection 4 mg (4 mg Intravenous Given 2/17/23 1334)   sodium chloride 0 9 % bolus 1,000 mL (0 mL Intravenous Stopped 2/17/23 1953)   iohexol (OMNIPAQUE) 300 mg/mL injection 35 mL (35 mL Oral Given 2/17/23 1502)   iohexol (OMNIPAQUE) 350 MG/ML injection (SINGLE-DOSE) 100 mL (100 mL Intravenous Given 2/17/23 1502)   cephalexin (KEFLEX) capsule 500 mg (500 mg Oral Given 2/17/23 1635)       Diagnostic Studies  Results Reviewed     Procedure Component Value Units Date/Time    HS Troponin I 2hr [[id number]]  (Normal) Collected: 02/17/23 1638    Lab Status: Final result Specimen: Blood from Arm, Left Updated: 02/17/23 1713     hs TnI 2hr 7 ng/L      Delta 2hr hsTnI 2 ng/L     Urine Microscopic [[id number]]  (Abnormal) Collected: 02/17/23 1444    Lab Status: Final result Specimen: Urine, Clean Catch Updated: 02/17/23 1526     RBC, UA 30-50 /hpf      WBC, UA 20-30 /hpf      Epithelial Cells Moderate /hpf      Bacteria, UA Moderate /hpf      MUCUS THREADS Innumerable    Urine culture [[id number]] Collected: 02/17/23 1444    Lab Status: In process Specimen: Urine, Clean Catch Updated: 02/17/23 1524    POCT pregnancy, urine [[id number]]  (Normal) Resulted: 02/17/23 1447    Lab Status: Final result Updated: 02/17/23 1447     EXT Preg Test, Ur Negative     Control Valid    Urine Macroscopic, POC [[id number]]  (Abnormal) Collected: 02/17/23 1444    Lab Status: Final result Specimen: Urine Updated: 02/17/23 1445     Color, UA Orange     Clarity, UA Clear     pH, UA 5 5     Leukocytes, UA Negative     Nitrite, UA Negative     Protein, UA 30 (1+) mg/dl      Glucose, UA Negative mg/dl      Ketones, UA >=160 (4+) mg/dl      Urobilinogen, UA 0 2 E U /dl      Bilirubin, UA Moderate     Occult Blood, UA Large     Specific Gravity, UA >=1 030    Narrative:      CLINITEK RESULT    HS Troponin 0hr (reflex protocol) [[id number]]  (Normal) Collected: 02/17/23 1443    Lab Status: Final result Specimen: Blood Updated: 02/17/23 1444     hs TnI 0hr 5 ng/L     TSH [[id number]]  (Normal) Collected: 02/17/23 1337    Lab Status: Final result Specimen: Blood from Arm, Right Updated: 02/17/23 1424     TSH 3RD GENERATON 2 558 uIU/mL     Narrative:      Patients undergoing fluorescein dye angiography may retain small amounts of fluorescein in the body for 48-72 hours post procedure  Samples containing fluorescein can produce falsely depressed TSH values  If the patient had this procedure,a specimen should be resubmitted post fluorescein clearance        Comprehensive metabolic panel [[id number]] Collected: 02/17/23 1337    Lab Status: Final result Specimen: Blood from Arm, Right Updated: 02/17/23 1411     Sodium 137 mmol/L      Potassium 3 6 mmol/L      Chloride 104 mmol/L      CO2 22 mmol/L      ANION GAP 11 mmol/L      BUN 11 mg/dL      Creatinine 0 63 mg/dL      Glucose 103 mg/dL      Calcium 9 3 mg/dL      AST 28 U/L      ALT 24 U/L      Alkaline Phosphatase 76 U/L      Total Protein 7 0 g/dL      Albumin 3 9 g/dL      Total Bilirubin 0 78 mg/dL      eGFR 106 ml/min/1 73sq m     Narrative:      [facility] guidelines for Chronic Kidney Disease (CKD):   •  Stage 1 with normal or high GFR (GFR > 90 mL/min/1 73 square meters)  •  Stage 2 Mild CKD (GFR = 60-89 mL/min/1 73 square meters)  •  Stage 3A Moderate CKD (GFR = 45-59 mL/min/1 73 square meters)  •  Stage 3B Moderate CKD (GFR = 30-44 mL/min/1 73 square meters)  •  Stage 4 Severe CKD (GFR = 15-29 mL/min/1 73 square meters)  •  Stage 5 End Stage CKD (GFR <15 mL/min/1 73 square meters)  Note: GFR calculation is accurate only with a steady state creatinine    Magnesium [[id number]]  (Normal) Collected: 02/17/23 1337    Lab Status: Final result Specimen: Blood from Arm, Right Updated: 02/17/23 1411     Magnesium 1 9 mg/dL     CBC and differential [[id number]] Collected: 02/17/23 1337    Lab Status: Final result Specimen: Blood from Arm, Right Updated: 02/17/23 1356     WBC 7 10 Thousand/uL      RBC 4 34 Million/uL      Hemoglobin 12 5 g/dL      Hematocrit 39 0 %      MCV 90 fL      MCH 28 8 pg      MCHC 32 1 g/dL      RDW 15 1 %      MPV 12 5 fL      Platelets 666 Thousands/uL      nRBC 0 /100 WBCs      Neutrophils Relative 68 %      Immat GRANS % 0 %      Lymphocytes Relative 18 %      Monocytes Relative 9 %      Eosinophils Relative 4 %      Basophils Relative 1 %      Neutrophils Absolute 4 85 Thousands/µL      Immature Grans Absolute 0 02 Thousand/uL      Lymphocytes Absolute 1 30 Thousands/µL      Monocytes Absolute 0 62 Thousand/µL      Eosinophils Absolute 0 26 Thousand/µL      Basophils Absolute 0 05 Thousands/µL     Fingerstick Glucose (POCT) [[id number]]  (Normal) Collected: 02/17/23 1306    Lab Status: Final result Updated: 02/17/23 1309     POC Glucose 99 mg/dl                  PE Study with CT abdomen & pelvis with contrast   Final Result by Dorien Felty, MD (02/17 1551)      No CT evidence of pulmonary embolism  Inflammatory stranding in the anterior abdominal wall as described above likely from recent prior laparoscopic port placement  No discrete abscess  Status post gastric bypass  No bowel obstruction or contrast extravasation  Mildly prominent common bile duct  Correlation with liver function tests advised  If clinically warranted, this can be further evaluated with ultrasound  Additional findings as above  Workstation performed: [id number]                    Procedures  ECG 12 Lead Documentation Only    Date/Time: 2/17/2023 1:21 PM  Performed by: Yana Porras PA-C  Authorized by: Yana Porras PA-C     Indications / Diagnosis:  Near syncope  ECG reviewed by me, the ED Provider: yes    Patient location:  ED  Previous ECG:     Previous ECG:  Unavailable    Comparison to cardiac monitor: No    Interpretation:     Interpretation: normal    Rate:     ECG rate:  71    ECG rate assessment: normal    Rhythm:     Rhythm: sinus rhythm    Ectopy:     Ectopy: none    QRS:     QRS axis:  Normal  Conduction:     Conduction: normal    ST segments:     ST segments:  Normal  T waves:     T waves: normal               ED Course  ED Course as of 02/17/23 1957 Fri Feb 17, 2023   1422 ECG 12 lead  Normal sinus rhythm  Normal ECG  No previous ECGs available   1447 hs TnI 0hr: 5  Will need delta based on onset of symptoms     1509 Ketones, UA(!): >=160 (4+)  High  Likely due to dehydration   1510 Blood, UA(!): Large  High   Pt on menses    1526 MUCUS THREADS(!): Innumerable 1526 Bacteria, UA(!): Moderate   1526 Epithelial Cells(!): Moderate   1526 WBC, UA(!): 20-30   1526 RBC, UA(!): 30-50   1526 UA reviewed: consistent with UTI   1604 PE Study with CT abdomen & pelvis with contrast  No CT evidence of pulmonary embolism      Inflammatory stranding in the anterior abdominal wall as described above likely from recent prior laparoscopic port placement  No discrete abscess      Status post gastric bypass  No bowel obstruction or contrast extravasation      Mildly prominent common bile duct  Correlation with liver function tests advised  If clinically warranted, this can be further evaluated with ultrasound      Additional findings as above  [address] to treat for UTI  Will give first dose of Keflex here    [address] 2hr hsTnI: 2   1941 Pt has been able to tolerate PO  Feeling a lot better  Would like to be discharged                               SBIRT 20yo+    Flowsheet Row Most Recent Value   SBIRT (25 yo +)    In order to provide better care to our patients, we are screening all of our patients for alcohol and drug use  Would it be okay to ask you these screening questions? No Filed at: 02/17/2023 [address] [facility]                    Medical Decision Making  49 YOF with PMH asthma, bell's palsy, DM, GERD, HTN, s/p joyce-en-y gastric bypass surgery 1/16/23 (Dr Brady Vazquez) presents today after medical emergency was called on her in the hospital bathroom  Pt was having left upper/mid back pain with sharp stabbing abdominal pain, nausea and near syncope  On arrival pt was pale and appeared dehydrated  Glucose was 99, not hypoglycemic  Workup was initiated to r/o acute abdominal pathology and PE, arrhythmias, MI, ACS  CBC showed no anemia, no increased WBC to suspect infection/sepsis  CMP showed no electrolyte abnormalities  Initial ECG non ischemic, no arrhythmias  Initial trop was 5 which required delta due to onset of symptoms  2 hour delta was 2  ECG still non ischemic   Based on ACS algorithm pt was able to be discharged  Do not suspect episode was cardiac related  CT chest abd pelvis ruled out PE, no obstruction or perforation  There was a prominent common bile duct noted however LFTs normal  Discussed following up with her PCP regarding this  Also discussed uterine fibroid- should follow up with OBGYN  Suspect episode was related to dehydration and pt not eating all day  UA also showed UTI so will treat for that  Pt significantly improved with fluids and was able to tolerate PO here  Pt was discharged in stable condition  I have discussed the plan to discharge pt from ED  The patient was discharged in stable condition   Patient ambulated off the department   Extensive return to emergency department precautions were discussed   Follow up with appropriate providers including primary care physician was discussed   Patient and/or their  primary decision maker expressed understanding  Maria C Berrios remained stable during entire emergency department stay  Portions of the record may have been created with voice recognition software  Occasional wrong word or "sound a like" substitutions may have occurred due to the inherent limitations of voice recognition software  Read the chart carefully and recognize, using context, where substitutions have occurred  Abdominal pain: acute illness or injury  Common bile duct dilation: chronic illness or injury  Near syncope: acute illness or injury  S/P gastric bypass: chronic illness or injury  Uterine fibroid: self-limited or minor problem  UTI (urinary tract infection): acute illness or injury  Amount and/or Complexity of Data Reviewed  Labs: ordered  Decision-making details documented in ED Course  Radiology: ordered  Decision-making details documented in ED Course  ECG/medicine tests:  Decision-making details documented in ED Course  Risk  Prescription drug management            Disposition  Final diagnoses:   Near syncope   Abdominal pain   S/P gastric bypass   Common bile duct dilation   Uterine fibroid   UTI (urinary tract infection)     Time reflects when diagnosis was documented in both MDM as applicable and the Disposition within this note     Time User Action Codes Description Comment    2/17/2023  4:04 PM Amirah Session Add [R55] Near syncope     2/17/2023  4:04 PM Amirah Session Add [R10 9] Abdominal pain     2/17/2023  4:05 PM Tisha Lemus [J61 03] S/P gastric bypass     2/17/2023  4:05 PM Amirah Session Add [K83 8] Common bile duct dilation     2/17/2023  4:05 PM Amirah Session Add [D25 9] Uterine fibroid     2/17/2023  4:05 PM Amirah Session Add [N39 0] UTI (urinary tract infection)       ED Disposition     ED Disposition   Discharge    Condition   Stable    Date/Time   Fri Feb 17, 2023  7:41 PM    Comment   Tammyjean claude Moreno discharge to home/self care  Follow-up Information     Follow up With Specialties Details Why Contact Info Additional Information    [address]  Emergency Department Emergency Medicine  If symptoms worsen [address] [place] [id number]  [address] [place] [address] Emergency Department, [address] [place] [address], [address] [place], [place] [id number] Schedule an appointment as soon as possible for a visit   [address] [place] [id number] [phone]             Patient's Medications   Discharge Prescriptions    CEPHALEXIN (KEFLEX) 250 MG CAPSULE    Take 2 capsules (500 mg total) by mouth every 12 (twelve) hours for 7 days       Start Date: 2/17/2023 End Date: 2/24/2023       Order Dose: 500 mg       Quantity: 28 capsule    Refills: 0       No discharge procedures on file      PDMP Review     None          ED Provider  Electronically Signed by           Jean Umana PA-C  02/17/23 5423

## 2023-02-20 LAB
BACTERIA UR CULT: ABNORMAL
BACTERIA UR CULT: ABNORMAL

## 2023-02-28 ENCOUNTER — CLINICAL SUPPORT (OUTPATIENT)
Dept: BARIATRICS | Facility: CLINIC | Age: 48
End: 2023-02-28

## 2023-02-28 DIAGNOSIS — Z98.84 BARIATRIC SURGERY STATUS: Primary | ICD-10-CM

## 2023-02-28 NOTE — PROGRESS NOTES
i  Name was verified by patient stating name? yes  ii   verified by patient stating? yes  iii  Verification of patient location yes  iv  Verified patient is in state in which I hold an active license? yes  v  Verified the patient is alone? yes  vi  I would like to verify that you were offered a live visit but are now consenting to this telephone visit? yes  vii  This visit is free yes  Weight Management Nutrition Class     Diagnosis: Morbid Obesity    Bariatric Surgeon: Dr Herb Steve    Surgery: Gastric Bypass Laparoscopic    Class: 5 week post op     Topics discussed today include:     fluid goals post op, protein goals post op, constipation, chew food well, exercise, avoidance of alcohol, PPI use, diet progression, hypoglycemia, dumping syndrome, protein supplems, vitamin/mineral supplements, calcium supplements, additional vitamin B12, iron supplements and fat soluble vitamins     Patient was able to verbalize basic diet (protein, fluid, vitamin and mineral) recommendations and possible nutrition-related complications   Yes

## 2023-04-19 LAB
CREAT ?TM UR-SCNC: 121 UMOL/L
EXT ALBUMIN URINE RANDOM: 0.4
HBA1C MFR BLD HPLC: 4.9 %
MICROALBUMIN/CREAT UR: 3 MG/G{CREAT}

## 2023-04-28 ENCOUNTER — OFFICE VISIT (OUTPATIENT)
Dept: BARIATRICS | Facility: CLINIC | Age: 48
End: 2023-04-28

## 2023-04-28 VITALS
SYSTOLIC BLOOD PRESSURE: 118 MMHG | TEMPERATURE: 97.5 F | DIASTOLIC BLOOD PRESSURE: 82 MMHG | BODY MASS INDEX: 35.14 KG/M2 | HEIGHT: 60 IN | WEIGHT: 179 LBS | HEART RATE: 82 BPM

## 2023-04-28 DIAGNOSIS — K91.2 POSTSURGICAL MALABSORPTION: ICD-10-CM

## 2023-04-28 DIAGNOSIS — E11.9 TYPE 2 DIABETES MELLITUS WITHOUT COMPLICATION, WITHOUT LONG-TERM CURRENT USE OF INSULIN (HCC): ICD-10-CM

## 2023-04-28 DIAGNOSIS — Z98.84 BARIATRIC SURGERY STATUS: ICD-10-CM

## 2023-04-28 DIAGNOSIS — Z48.815 ENCOUNTER FOR SURGICAL AFTERCARE FOLLOWING SURGERY OF DIGESTIVE SYSTEM: Primary | ICD-10-CM

## 2023-04-28 DIAGNOSIS — E66.9 OBESITY, CLASS I, BMI 30-34.9: ICD-10-CM

## 2023-04-28 NOTE — PROGRESS NOTES
Assessment/Plan:     Patient ID: Fracni Wooten is a 51 y o  female  Bariatric Surgery Status    -s/p Gisselle-En-Y Gastric Bypass with Dr Vishnu Enciso on 01/17/2023  Presents to the office today for 2nd post op visit Overall doing well, tolerating a regular diet  Denies having any abdominal pain, N/V/D/C, regurgitation, reflux or dysphagia  Taking her MVI and PPI daily  PLAN:     - continue with omeprazole for a year  - Routine follow up in 3 months for 3rd post op visit  - Continue with healthy lifestyle, adequate protein intake of 60 gm, fluid intake of at least 64 oz  - Continue with MVI daily  - Activity as tolerated  - Labs ordered and will adjust accordingly if any deficiency  - Follow up with RD and SW as needed  Type II DM - Last A1C - 4 9% checked on 04/19/2023  Off DM meds  Continue to monitor for now and f/u with PCP  · Continued/Maintain healthy weight loss with good nutrition intakes  · Adequate hydration with at least 64oz  fluid intake  · Follow diet as discussed  · Follow vitamin and mineral recommendations as reviewed with you  · Exercise as tolerated  · Colonoscopy referral made: due- patient would like to defer for now  · Mammogram - Due - patient also would like to defer for now  · Follow-up in 3 months for 3rd post op visit  We kindly ask that your arrive 15 minutes before your scheduled appointment time with your provider to allow our staff to room you, get your vital signs and update your chart  · Get lab work done  Please call the office if you need a script  It is recommended to check with your insurance BEFORE getting labs done to make sure they are covered by your policy  · Call our office if you have any problems with abdominal pain especially associated with fever, chills, nausea, vomiting or any other concerns      · All  Post-bariatric surgery patients should be aware that very small quantities of any alcohol can cause impairment and it is very possible not to feel the effect  The effect can be in the system for several hours  It is also a stomach irritant  · It is advised to AVOID alcohol, Nonsteroidal antiinflammatory drugs (NSAIDS) and nicotine of all forms   Any of these can cause stomach irritation/pain  · Discussed the effects of alcohol on a bariatric patient and the increased impairment risk  · Keep up the good work! Postsurgical Malabsorption   -At risk for malabsorption of vitamins/minerals secondary to malabsorption and restriction of intake from bariatric surgery  -Currently taking adequate postop bariatric surgery vitamin supplementation  -Next set of bariatric labs ordered for approximately 2 weeks  -Patient received education about the importance of adhering to a lifelong supplementation regimen to avoid vitamin/mineral deficiencies      Diagnoses and all orders for this visit:    Encounter for surgical aftercare following surgery of digestive system  -     Zinc; Future  -     Vitamin D 25 hydroxy; Future  -     Vitamin B12; Future  -     Vitamin B1, whole blood; Future  -     Vitamin A; Future  -     PTH, intact; Future  -     Ferritin; Future  -     Folate; Future  -     Iron Saturation %; Future    Bariatric surgery status  -     Zinc; Future  -     Vitamin D 25 hydroxy; Future  -     Vitamin B12; Future  -     Vitamin B1, whole blood; Future  -     Vitamin A; Future  -     PTH, intact; Future  -     Ferritin; Future  -     Folate; Future  -     Iron Saturation %; Future    Postsurgical malabsorption  -     Zinc; Future  -     Vitamin D 25 hydroxy; Future  -     Vitamin B12; Future  -     Vitamin B1, whole blood; Future  -     Vitamin A; Future  -     PTH, intact; Future  -     Ferritin; Future  -     Folate; Future  -     Iron Saturation %; Future    Obesity, Class I, BMI 30-34 9  -     Zinc; Future  -     Vitamin D 25 hydroxy; Future  -     Vitamin B12; Future  -     Vitamin B1, whole blood;  Future  -     Vitamin A; Future  -     PTH, intact; Future  -     Ferritin; Future  -     Folate; Future  -     Iron Saturation %; Future    Type 2 diabetes mellitus without complication, without long-term current use of insulin (HCC)         Subjective:      Patient ID: Milon Boeck is a 51 y o  female  -s/p Gisselle-En-Y Gastric Bypass with Dr Venancio Britton on 01/17/2023  Presents to the office today for 2nd post op visit Overall doing well, tolerating a regular diet  Denies having any abdominal pain, N/V/D/C, regurgitation, reflux or dysphagia  Taking her MVI and PPI daily  Initial: 248 lbs  Current: 179 lbs  EWL: (Weight loss is ahead of schedule at this post surgical period )  Reilly: current   Current BMI is Body mass index is 34 96 kg/m²  · Tolerating a regular diet-yes  · Eating at least 60 grams of protein per day-yes  · Following 30/60 minute rule with liquids-yes  · Drinking at least 64 ounces of fluid per day-yes  · Drinking carbonated beverages-no  · Sufficient exercise-yes - goes to the gym nearly daily  · Using NSAIDs regularly-no  · Using nicotine-no  · Using alcohol-no  · Supplements: Multivitamins and Calcium (3 chews)    · EWL is 57%, which places the patient ahead of schedule for expected post surgical weight loss at this time  The following portions of the patient's history were reviewed and updated as appropriate: allergies, current medications, past family history, past medical history, past social history, past surgical history and problem list     Review of Systems   Constitutional: Negative  Respiratory: Negative  Cardiovascular: Negative  Gastrointestinal: Negative  Musculoskeletal: Positive for arthralgias (knee pain)  Neurological: Positive for headaches (hx of migraines)  Psychiatric/Behavioral: Negative            Objective:    /82   Pulse 82   Temp 97 5 °F (36 4 °C) (Tympanic)   Ht 5' (1 524 m)   Wt 81 2 kg (179 lb)   BMI 34 96 kg/m²      Physical Exam  Vitals and nursing note reviewed  Constitutional:       Appearance: Normal appearance  She is obese  Cardiovascular:      Rate and Rhythm: Normal rate and regular rhythm  Pulses: Normal pulses  Heart sounds: Normal heart sounds  Pulmonary:      Effort: Pulmonary effort is normal       Breath sounds: Normal breath sounds  Abdominal:      General: Bowel sounds are normal       Palpations: Abdomen is soft  Tenderness: There is no abdominal tenderness  Comments: All incisions healed well without any signs of infection     Musculoskeletal:         General: Normal range of motion  Skin:     General: Skin is warm and dry  Neurological:      General: No focal deficit present  Mental Status: She is alert and oriented to person, place, and time  Psychiatric:         Mood and Affect: Mood normal          Behavior: Behavior normal          Thought Content:  Thought content normal          Judgment: Judgment normal

## 2023-05-02 NOTE — PROGRESS NOTES
Bariatric Follow Up Nutrition Note    Type of surgery  Gastric bypass: laparoscopic  Surgery Date: 1/16/2023  3 months  post-op  Surgeon: Dr Krystal Brown  51 y o   female       Wt Readings from Last 3 Encounters:   04/28/23 81 2 kg (179 lb)   02/17/23 94 2 kg (207 lb 10 8 oz)   01/26/23 97 8 kg (215 lb 8 oz)     No calculations performed for this visit  Estimated caloric intake at 3 months post op 600- 800 kcal per day   Estimated protein needs 57-70 grams per day (1 0-1 5 gms/kg IBW )   Estimated fluid needs 64 ounces (35 ml/kg IBW )      Weight on Day of Weight Loss Surgery: 224#  Weight in (lb) to have BMI = 25: 127 3 lbs   Post-Op Wt Loss: 70 1#/ 58% EBWL in 4 month(s)  Wt loss greater than expected at this time post op     Review of History and Medications   Past Medical History:   Diagnosis Date    Arthritis     Asthma     Allergic    Bell's palsy     2013    Diabetes mellitus ([facility] [address] )     GERD (gastroesophageal reflux disease)     Hypercholesteremia     Hypertension     Migraines     Seasonal allergies      Past Surgical History:   Procedure Laterality Date    COSMETIC SURGERY      Face-birth pia removal    DILATION AND CURETTAGE OF UTERUS      EGD      VA LAPS GSTR RSTCV PX W/BYP BALJINDER-EN-Y LIMB <150 CM N/A 1/16/2023    Procedure: BYPASS GASTRIC  R-N-Y LAP , intra-op  EGD;  Surgeon: Bill Joshi MD;  Location: [facility] OR;  Service: Bariatrics    WISDOM TOOTH EXTRACTION       Social History     Socioeconomic History    Marital status:      Spouse name: Not on file    Number of children: Not on file    Years of education: Not on file    Highest education level: Not on file   Occupational History    Not on file   Tobacco Use    Smoking status: Never    Smokeless tobacco: Never   Vaping Use    Vaping Use: Never used   Substance and Sexual Activity    Alcohol use: Yes     Comment: socially/rare    Drug use: Never    Sexual activity: Not on file   Other Topics Concern    Not on file   Social History Narrative    Not on file     Social Determinants of Health     Financial Resource Strain: Not on file   Food Insecurity: Not on file   Transportation Needs: Not on file   Physical Activity: Not on file   Stress: Not on file   Social Connections: Not on file   Intimate Partner Violence: Not on file   Housing Stability: Not on file       Current Outpatient Medications:     acetaminophen (TYLENOL) 160 mg/5 mL liquid, , Disp: , Rfl:     albuterol (PROVENTIL HFA,VENTOLIN HFA) 90 mcg/act inhaler, TAKE 2 PUFFS BY MOUTH EVERY 6 HOURS AS NEEDED FOR WHEEZE, Disp: , Rfl:     budesonide (Pulmicort Flexhaler) 90 MCG/ACT inhaler, Inhale 1 puff 2 (two) times a day Rinse mouth after use , Disp: , Rfl:     Calcium Citrate-Vitamin D 500-12 5 MG-MCG PACK, Take 1 tablet by mouth Three times a day, Disp: , Rfl:     fenofibrate micronized (LOFIBRA) 134 MG capsule, Take 134 mg by mouth, Disp: , Rfl:     fenofibrate micronized (LOFIBRA) 200 MG capsule, Take 200 mg by mouth daily (Patient not taking: Reported on 1/26/2023), Disp: , Rfl:     Multiple Vitamin (MULTIVITAMIN ADULT PO), Take 1 tablet by mouth daily, Disp: , Rfl:     omeprazole (PriLOSEC) 20 mg delayed release capsule, TAKE 1 CAPSULE BY MOUTH EVERY DAY, Disp: 90 capsule, Rfl: 2    rizatriptan (MAXALT) 5 MG tablet, Take 5 mg by mouth once as needed for migraine May repeat in 2 hours if needed, Disp: , Rfl:     topiramate (TOPAMAX) 25 mg tablet, Take one to three pills by mouth in the evening as directed w plenty of water, Disp: , Rfl:     Food Intake and Lifestyle Assessment   Food Intake Assessment completed via usual diet recall  Pt reports doing well on work days due to routine     She is eating 3 meals per day and drinking one shake per day   On weekends, she gets up later and tends to only eat 2 meals per day and drink one shake  Concerned about eating enough protein and calories   She has been tracking on baritastic - ranges from 520 calories to over 700 calories   Beverage intake: water  Diet texture/stage: regular  Protein supplement: one per day   Estimated protein intake per day: ~60-70 grams per day pt log in baritastic   Estimated fluid intake per day: 64 ounces per day ( include protein shake )   Meals eaten away from home: n/a  Typical meal pattern: 2-3 meals per day and 0 snacks per day  Eating Behaviors: Appropriate diet advancement, Appropriate portion sizes and Does not drink with meals and waits 30-minutes after meal before resuming drinking    Vitamin and Mineral regimen: reviewed with NP at last visit     Food allergies or intolerances: some difficulty with salad- feels full quickly  Has some rice at a party that was uncomfortable     Cultural or Christian considerations: N/A    Lab Results   Component Value Date    HGBA1C 4 9 04/19/2023         Physical Assessment  Nutrition Related Findings  Loss of Hunger    Physical Activity  Types of exercise: gym  Goes to gym several times per week   Current physical limitations: none currently     Psychosocial Assessment   Support systems: son   Socioeconomic factors: works full time     Nutrition Diagnosis- improving since surgery   Diagnosis: Overweight / Obesity (NC-3 3)  Related to: Physical inactivity and Excessive energy intake  As Evidenced by: BMI >25     Nutrition Prescription: Recommend the following diet  800-1000 kcal per day / 70-75 grams protein   3 to 4 mini meals  One protein shake per day       Interventions and Teaching   Patient educated on post-op nutrition guidelines  Patient educated and handouts provided    Capacity of post-surgery stomach  Diet progression  Adequate hydration  Expected weight loss  Exercise  Nutrition considerations after surgery  Protein supplements  Appropriate carbohydrate, protein, and fat intake, and food/fluid choices to maximize safe weight loss, nutrient intake, and tolerance   Dietary and lifestyle changes  Intuitive eating  Techniques for self monitoring and keeping daily food journal  Potential for food intolerance after surgery, and ways to deal with them including: lactose intolerance, nausea, reflux, vomiting, diarrhea, food intolerance, appetite changes, gas    Patient is not currently pregnant and doesn't desire to become pregnant a minimum of one year post-op    Education provided to: patient    Barriers to learning: No barriers identified    Readiness to change: action    Comprehension: demonstrated understanding and verbalizes understanding     Expected Compliance: good    Evaluation/Monitoring   Eating pattern as discussed Tolerance of nutrition prescription Body weight Lab values Physical activity Bowel pattern    Goals  Food journal, Eat 3 meals per day and plus one planned snack    Food log 800-1000 calories per day /70-75 grams of protein per day   Drink one protein shake per day    Continue with gym 3-4 times per week   Set timers for weekends to remind herself to eat every 3-4 hours - start off day on weekend with a protein shake     Time Spent:   45 Minutes

## 2023-05-04 ENCOUNTER — OFFICE VISIT (OUTPATIENT)
Dept: BARIATRICS | Facility: CLINIC | Age: 48
End: 2023-05-04

## 2023-05-04 VITALS — WEIGHT: 178.5 LBS | BODY MASS INDEX: 35.05 KG/M2 | HEIGHT: 60 IN

## 2023-05-04 DIAGNOSIS — Z98.84 BARIATRIC SURGERY STATUS: Primary | ICD-10-CM

## 2023-07-28 ENCOUNTER — OFFICE VISIT (OUTPATIENT)
Dept: BARIATRICS | Facility: CLINIC | Age: 48
End: 2023-07-28

## 2023-07-28 VITALS
BODY MASS INDEX: 31.22 KG/M2 | WEIGHT: 159 LBS | SYSTOLIC BLOOD PRESSURE: 110 MMHG | DIASTOLIC BLOOD PRESSURE: 80 MMHG | HEART RATE: 82 BPM | TEMPERATURE: 97.6 F | HEIGHT: 60 IN

## 2023-07-28 DIAGNOSIS — Z48.815 ENCOUNTER FOR SURGICAL AFTERCARE FOLLOWING SURGERY OF DIGESTIVE SYSTEM: Primary | ICD-10-CM

## 2023-07-28 DIAGNOSIS — R79.0 LOW FERRITIN: ICD-10-CM

## 2023-07-28 DIAGNOSIS — Z98.84 BARIATRIC SURGERY STATUS: ICD-10-CM

## 2023-07-28 DIAGNOSIS — E66.9 OBESITY, CLASS I, BMI 30-34.9: ICD-10-CM

## 2023-07-28 DIAGNOSIS — K91.2 POSTSURGICAL MALABSORPTION: ICD-10-CM

## 2023-07-28 NOTE — PROGRESS NOTES
Assessment/Plan:     Patient ID: Zan Larson is a 51 y.o. female. Bariatric Surgery Status  -s/p Gisselle-En-Y Gastric Bypass with Dr. Marichuy Arroyo on 01/17/2023. Presents to the office today for 3rd post op visit Overall doing well, tolerating a regular diet. Denies having any abdominal pain, N/V/D/C, regurgitation, reflux or dysphagia. Taking her MVI and PPI daily. No concerns at this time. PLAN:     - continue with omeprazole for now. - Routine follow up in 6 months for annual visit. - Continue with healthy lifestyle, adequate protein intake of 60 gm, fluid intake of at least 64 oz. - Continue with MVI daily. - Activity as tolerated. - Labs ordered and will adjust accordingly if any deficiency. - Follow up with RD and SW as needed. · Continued/Maintain healthy weight loss with good nutrition intakes. · Adequate hydration with at least 64oz. fluid intake. · Follow diet as discussed. · Follow vitamin and mineral recommendations as reviewed with you. · Exercise as tolerated. · Colonoscopy referral made: declined at this time  · Mammogram - declined at this time. · Follow-up in 6 months for annual visit. We kindly ask that your arrive 15 minutes before your scheduled appointment time with your provider to allow our staff to room you, get your vital signs and update your chart. · Get lab work done prior to annual visit. Please call the office if you need a script. It is recommended to check with your insurance BEFORE getting labs done to make sure they are covered by your policy. · Call our office if you have any problems with abdominal pain especially associated with fever, chills, nausea, vomiting or any other concerns. · All  Post-bariatric surgery patients should be aware that very small quantities of any alcohol can cause impairment and it is very possible not to feel the effect. The effect can be in the system for several hours. It is also a stomach irritant.      · It is advised to AVOID alcohol, Nonsteroidal antiinflammatory drugs (NSAIDS) and nicotine of all forms . Any of these can cause stomach irritation/pain. · Discussed the effects of alcohol on a bariatric patient and the increased impairment risk. · Keep up the good work! Postsurgical Malabsorption   -At risk for malabsorption of vitamins/minerals secondary to malabsorption and restriction of intake from bariatric surgery  -Currently taking adequate postop bariatric surgery vitamin supplementation  -Last set of bariatric labs completed on 07/25/2023 and showed mildly low iron, protein, and low ferritin levels. -Next set of bariatric labs ordered for approximately 2 weeks  -Patient received education about the importance of adhering to a lifelong supplementation regimen to avoid vitamin/mineral deficiencies      Diagnoses and all orders for this visit:    Encounter for surgical aftercare following surgery of digestive system  -     Iron Panel (Includes Ferritin, Iron Sat%, Iron, and TIBC); Future  -     CBC and Platelet; Future    Bariatric surgery status  -     Iron Panel (Includes Ferritin, Iron Sat%, Iron, and TIBC); Future  -     CBC and Platelet; Future  -     Comprehensive metabolic panel; Future    Postsurgical malabsorption    Obesity, Class I, BMI 30-34.9  -     Comprehensive metabolic panel; Future    Low ferritin  -     Iron Panel (Includes Ferritin, Iron Sat%, Iron, and TIBC); Future  -     CBC and Platelet; Future         Subjective:      Patient ID: Lesli Carney is a 51 y.o. female. -s/p Gisselle-En-Y Gastric Bypass with Dr. Roopa Herman on 01/17/2023. Presents to the office today for 3rd post op visit Overall doing well, tolerating a regular diet. Denies having any abdominal pain, N/V/D/C, regurgitation, reflux or dysphagia. Taking her MVI and PPI daily. No concerns at this time.        Initial:248 lbs  Current: 159 lbs  EWL: (Weight loss is ahead of schedule at this post surgical period.)  Reilly: current Current BMI is Body mass index is 31.05 kg/m². · Tolerating a regular diet-yes  · Eating at least 60 grams of protein per day-yes  · Following 30/60 minute rule with liquids-yes  · Drinking at least 64 ounces of fluid per day-yes  · Drinking carbonated beverages-no  · Sufficient exercise-yes - going to the gym 4 times per week. · Using NSAIDs regularly-no  · Using nicotine-no  · Using alcohol-no  · Supplements: Multivitamins and Calcium , collagen powder. - add the vitron C.     · EWL is 74%, which places the patient ahead of schedule for expected post surgical weight loss at this time. The following portions of the patient's history were reviewed and updated as appropriate: allergies, current medications, past family history, past medical history, past social history, past surgical history and problem list.    Review of Systems   Constitutional: Negative. Respiratory: Negative. Cardiovascular: Negative. Gastrointestinal: Negative. Musculoskeletal: Negative. Neurological: Negative. Psychiatric/Behavioral: Negative. Objective:    /80   Pulse 82   Temp 97.6 °F (36.4 °C) (Tympanic)   Ht 5' (1.524 m)   Wt 72.1 kg (159 lb)   BMI 31.05 kg/m²      Physical Exam  Vitals and nursing note reviewed. Constitutional:       Appearance: Normal appearance. She is obese. Cardiovascular:      Rate and Rhythm: Normal rate and regular rhythm. Pulses: Normal pulses. Pulmonary:      Effort: Pulmonary effort is normal.      Breath sounds: Normal breath sounds. Abdominal:      General: Bowel sounds are normal.      Palpations: Abdomen is soft. Musculoskeletal:         General: Normal range of motion. Skin:     General: Skin is warm and dry. Neurological:      General: No focal deficit present. Mental Status: She is alert and oriented to person, place, and time.    Psychiatric:         Mood and Affect: Mood normal.         Behavior: Behavior normal.         Thought Content:  Thought content normal.         Judgment: Judgment normal.

## 2023-07-30 LAB
25(OH)D3 SERPL-MCNC: 44 NG/ML (ref 30–100)
ALBUMIN SERPL-MCNC: 3.7 G/DL (ref 3.6–5.1)
ALBUMIN/GLOB SERPL: 1.7 (CALC) (ref 1–2.5)
ALP SERPL-CCNC: 66 U/L (ref 31–125)
ALT SERPL-CCNC: 18 U/L (ref 6–29)
AST SERPL-CCNC: 19 U/L (ref 10–35)
BASOPHILS # BLD AUTO: 43 CELLS/UL (ref 0–200)
BASOPHILS NFR BLD AUTO: 0.6 %
BILIRUB SERPL-MCNC: 0.5 MG/DL (ref 0.2–1.2)
BUN SERPL-MCNC: 18 MG/DL (ref 7–25)
BUN/CREAT SERPL: ABNORMAL (CALC) (ref 6–22)
CALCIUM SERPL-MCNC: 9.2 MG/DL (ref 8.6–10.2)
CALCIUM SERPL-MCNC: 9.2 MG/DL (ref 8.6–10.2)
CHLORIDE SERPL-SCNC: 105 MMOL/L (ref 98–110)
CO2 SERPL-SCNC: 26 MMOL/L (ref 20–32)
CREAT SERPL-MCNC: 0.64 MG/DL (ref 0.5–0.99)
EOSINOPHIL # BLD AUTO: 220 CELLS/UL (ref 15–500)
EOSINOPHIL NFR BLD AUTO: 3.1 %
ERYTHROCYTE [DISTWIDTH] IN BLOOD BY AUTOMATED COUNT: 12.5 % (ref 11–15)
EST. AVERAGE GLUCOSE BLD GHB EST-MCNC: 94 MG/DL
EST. AVERAGE GLUCOSE BLD GHB EST-SCNC: 5.2 MMOL/L
FERRITIN SERPL-MCNC: 10 NG/ML (ref 16–232)
FOLATE SERPL-MCNC: 23.4 NG/ML
GFR/BSA.PRED SERPLBLD CYS-BASED-ARV: 108 ML/MIN/1.73M2
GLOBULIN SER CALC-MCNC: 2.2 G/DL (CALC) (ref 1.9–3.7)
GLUCOSE SERPL-MCNC: 80 MG/DL (ref 65–99)
HBA1C MFR BLD: 4.9 % OF TOTAL HGB
HCT VFR BLD AUTO: 37.9 % (ref 35–45)
HGB BLD-MCNC: 12.2 G/DL (ref 11.7–15.5)
IRON SATN MFR SERPL: 13 % (CALC) (ref 16–45)
IRON SERPL-MCNC: 44 MCG/DL (ref 40–190)
LYMPHOCYTES # BLD AUTO: 1683 CELLS/UL (ref 850–3900)
LYMPHOCYTES NFR BLD AUTO: 23.7 %
MCH RBC QN AUTO: 30.3 PG (ref 27–33)
MCHC RBC AUTO-ENTMCNC: 32.2 G/DL (ref 32–36)
MCV RBC AUTO: 94.3 FL (ref 80–100)
MONOCYTES # BLD AUTO: 483 CELLS/UL (ref 200–950)
MONOCYTES NFR BLD AUTO: 6.8 %
NEUTROPHILS # BLD AUTO: 4672 CELLS/UL (ref 1500–7800)
NEUTROPHILS NFR BLD AUTO: 65.8 %
PLATELET # BLD AUTO: 234 THOUSAND/UL (ref 140–400)
PMV BLD REES-ECKER: 11.9 FL (ref 7.5–12.5)
POTASSIUM SERPL-SCNC: 4 MMOL/L (ref 3.5–5.3)
PROT SERPL-MCNC: 5.9 G/DL (ref 6.1–8.1)
PTH-INTACT SERPL-MCNC: 26 PG/ML (ref 16–77)
RBC # BLD AUTO: 4.02 MILLION/UL (ref 3.8–5.1)
SODIUM SERPL-SCNC: 138 MMOL/L (ref 135–146)
TIBC SERPL-MCNC: 331 MCG/DL (CALC) (ref 250–450)
VIT A SERPL-MCNC: 27 MCG/DL (ref 38–98)
VIT B1 BLD-SCNC: 182 NMOL/L (ref 78–185)
VIT B12 SERPL-MCNC: 776 PG/ML (ref 200–1100)
WBC # BLD AUTO: 7.1 THOUSAND/UL (ref 3.8–10.8)
ZINC SERPL-MCNC: 79 MCG/DL (ref 60–130)

## 2023-07-31 ENCOUNTER — TELEPHONE (OUTPATIENT)
Dept: BARIATRICS | Facility: CLINIC | Age: 48
End: 2023-07-31

## 2023-07-31 DIAGNOSIS — E50.9 VITAMIN A DEFICIENCY: ICD-10-CM

## 2023-07-31 DIAGNOSIS — R79.0 LOW FERRITIN: ICD-10-CM

## 2023-07-31 DIAGNOSIS — Z98.84 BARIATRIC SURGERY STATUS: Primary | ICD-10-CM

## 2023-07-31 NOTE — TELEPHONE ENCOUNTER
----- Message from [employer] sent at 7/31/2023  8:32 AM EDT -----  Please call patient to let her know her labs are within limits EXCEPT FOR THE FOLLOWING:     - iron storage levels are low without anemia. Please add vitron C once a day for 2 weeks. If you are tolerating this, you can increase to twice a day after. Iron can be constipating, please start on stool softeners as needed to help with this. - Your vitamin A level is  low, which can affect your night vision. Recommend that you take 10,000  IU of retinyl acetate or retinyl palmitate ( Vitamin A) per day for 3 months. It is important that you take an actual vitamin A supplement for repletion, and not a carotene based supplement. After 3 months,  discontinue the vitamin A supplement and check your vitamin A level. A lab slip is enclosed to check your level in 3 months. Long term vitamin A supplementation can be toxic, so it is important to discontinue your vitamin A supplementation after 12 weeks. If you experience symptoms of toxicity such as :  Nausea, vomiting, blurred vision, severe headache, and vertigo, discontinue the vitamin A supplement immediately and call the office.     - protein levels mildly low. Please increase protein intake from 60-80 gm of protein per day, this level should level out on its own. - will repeat levels in 3 months to see if your levels improve.      Thank you  PHAN Correa

## 2023-07-31 NOTE — TELEPHONE ENCOUNTER
Reached out to Pt re: labs. Reviewed with Pt results and CRNP recommendations. Pt verbalized understanding and was agreeable to plan. Sent results/ recommendations to Pt via [employer] message.

## 2023-12-05 DIAGNOSIS — E66.01 OBESITY, CLASS III, BMI 40-49.9 (MORBID OBESITY) (HCC): ICD-10-CM

## 2023-12-06 RX ORDER — OMEPRAZOLE 20 MG/1
CAPSULE, DELAYED RELEASE ORAL
Qty: 90 CAPSULE | Refills: 2 | Status: SHIPPED | OUTPATIENT
Start: 2023-12-06

## 2024-01-21 LAB
25(OH)D3 SERPL-MCNC: 47 NG/ML (ref 30–100)
ALBUMIN SERPL-MCNC: 4.2 G/DL (ref 3.6–5.1)
ALBUMIN/GLOB SERPL: 1.8 (CALC) (ref 1–2.5)
ALP SERPL-CCNC: 63 U/L (ref 31–125)
ALT SERPL-CCNC: 32 U/L (ref 6–29)
AST SERPL-CCNC: 20 U/L (ref 10–35)
BASOPHILS # BLD AUTO: 52 CELLS/UL (ref 0–200)
BASOPHILS NFR BLD AUTO: 0.9 %
BILIRUB SERPL-MCNC: 0.6 MG/DL (ref 0.2–1.2)
BUN SERPL-MCNC: 24 MG/DL (ref 7–25)
BUN/CREAT SERPL: ABNORMAL (CALC) (ref 6–22)
CALCIUM SERPL-MCNC: 9.2 MG/DL (ref 8.6–10.2)
CALCIUM SERPL-MCNC: 9.2 MG/DL (ref 8.6–10.2)
CHLORIDE SERPL-SCNC: 104 MMOL/L (ref 98–110)
CO2 SERPL-SCNC: 29 MMOL/L (ref 20–32)
CREAT SERPL-MCNC: 0.68 MG/DL (ref 0.5–0.99)
EOSINOPHIL # BLD AUTO: 157 CELLS/UL (ref 15–500)
EOSINOPHIL NFR BLD AUTO: 2.7 %
ERYTHROCYTE [DISTWIDTH] IN BLOOD BY AUTOMATED COUNT: 12.4 % (ref 11–15)
FERRITIN SERPL-MCNC: 19 NG/ML (ref 16–232)
FOLATE SERPL-MCNC: >24 NG/ML
GFR/BSA.PRED SERPLBLD CYS-BASED-ARV: 107 ML/MIN/1.73M2
GLOBULIN SER CALC-MCNC: 2.3 G/DL (CALC) (ref 1.9–3.7)
GLUCOSE SERPL-MCNC: 84 MG/DL (ref 65–99)
HCT VFR BLD AUTO: 40.1 % (ref 35–45)
HGB BLD-MCNC: 13.4 G/DL (ref 11.7–15.5)
IRON SATN MFR SERPL: 24 % (CALC) (ref 16–45)
IRON SERPL-MCNC: 86 MCG/DL (ref 40–190)
LYMPHOCYTES # BLD AUTO: 1531 CELLS/UL (ref 850–3900)
LYMPHOCYTES NFR BLD AUTO: 26.4 %
MCH RBC QN AUTO: 32.1 PG (ref 27–33)
MCHC RBC AUTO-ENTMCNC: 33.4 G/DL (ref 32–36)
MCV RBC AUTO: 95.9 FL (ref 80–100)
MONOCYTES # BLD AUTO: 348 CELLS/UL (ref 200–950)
MONOCYTES NFR BLD AUTO: 6 %
NEUTROPHILS # BLD AUTO: 3712 CELLS/UL (ref 1500–7800)
NEUTROPHILS NFR BLD AUTO: 64 %
PLATELET # BLD AUTO: 248 THOUSAND/UL (ref 140–400)
PMV BLD REES-ECKER: 11.1 FL (ref 7.5–12.5)
POTASSIUM SERPL-SCNC: 3.7 MMOL/L (ref 3.5–5.3)
PROT SERPL-MCNC: 6.5 G/DL (ref 6.1–8.1)
PTH-INTACT SERPL-MCNC: 32 PG/ML (ref 16–77)
RBC # BLD AUTO: 4.18 MILLION/UL (ref 3.8–5.1)
SODIUM SERPL-SCNC: 140 MMOL/L (ref 135–146)
TIBC SERPL-MCNC: 364 MCG/DL (CALC) (ref 250–450)
VIT B12 SERPL-MCNC: 1525 PG/ML (ref 200–1100)
WBC # BLD AUTO: 5.8 THOUSAND/UL (ref 3.8–10.8)
ZINC SERPL-MCNC: 75 MCG/DL (ref 60–130)

## 2024-01-24 LAB
25(OH)D3 SERPL-MCNC: 47 NG/ML (ref 30–100)
ALBUMIN SERPL-MCNC: 4.2 G/DL (ref 3.6–5.1)
ALBUMIN/GLOB SERPL: 1.8 (CALC) (ref 1–2.5)
ALP SERPL-CCNC: 63 U/L (ref 31–125)
ALT SERPL-CCNC: 32 U/L (ref 6–29)
AST SERPL-CCNC: 20 U/L (ref 10–35)
BASOPHILS # BLD AUTO: 52 CELLS/UL (ref 0–200)
BASOPHILS NFR BLD AUTO: 0.9 %
BILIRUB SERPL-MCNC: 0.6 MG/DL (ref 0.2–1.2)
BUN SERPL-MCNC: 24 MG/DL (ref 7–25)
BUN/CREAT SERPL: ABNORMAL (CALC) (ref 6–22)
CALCIUM SERPL-MCNC: 9.2 MG/DL (ref 8.6–10.2)
CALCIUM SERPL-MCNC: 9.2 MG/DL (ref 8.6–10.2)
CHLORIDE SERPL-SCNC: 104 MMOL/L (ref 98–110)
CO2 SERPL-SCNC: 29 MMOL/L (ref 20–32)
CREAT SERPL-MCNC: 0.68 MG/DL (ref 0.5–0.99)
EOSINOPHIL # BLD AUTO: 157 CELLS/UL (ref 15–500)
EOSINOPHIL NFR BLD AUTO: 2.7 %
ERYTHROCYTE [DISTWIDTH] IN BLOOD BY AUTOMATED COUNT: 12.4 % (ref 11–15)
FERRITIN SERPL-MCNC: 19 NG/ML (ref 16–232)
FOLATE SERPL-MCNC: >24 NG/ML
GFR/BSA.PRED SERPLBLD CYS-BASED-ARV: 107 ML/MIN/1.73M2
GLOBULIN SER CALC-MCNC: 2.3 G/DL (CALC) (ref 1.9–3.7)
GLUCOSE SERPL-MCNC: 84 MG/DL (ref 65–99)
HCT VFR BLD AUTO: 40.1 % (ref 35–45)
HGB BLD-MCNC: 13.4 G/DL (ref 11.7–15.5)
IRON SATN MFR SERPL: 24 % (CALC) (ref 16–45)
IRON SERPL-MCNC: 86 MCG/DL (ref 40–190)
LYMPHOCYTES # BLD AUTO: 1531 CELLS/UL (ref 850–3900)
LYMPHOCYTES NFR BLD AUTO: 26.4 %
MCH RBC QN AUTO: 32.1 PG (ref 27–33)
MCHC RBC AUTO-ENTMCNC: 33.4 G/DL (ref 32–36)
MCV RBC AUTO: 95.9 FL (ref 80–100)
MONOCYTES # BLD AUTO: 348 CELLS/UL (ref 200–950)
MONOCYTES NFR BLD AUTO: 6 %
NEUTROPHILS # BLD AUTO: 3712 CELLS/UL (ref 1500–7800)
NEUTROPHILS NFR BLD AUTO: 64 %
PLATELET # BLD AUTO: 248 THOUSAND/UL (ref 140–400)
PMV BLD REES-ECKER: 11.1 FL (ref 7.5–12.5)
POTASSIUM SERPL-SCNC: 3.7 MMOL/L (ref 3.5–5.3)
PROT SERPL-MCNC: 6.5 G/DL (ref 6.1–8.1)
PTH-INTACT SERPL-MCNC: 32 PG/ML (ref 16–77)
RBC # BLD AUTO: 4.18 MILLION/UL (ref 3.8–5.1)
SODIUM SERPL-SCNC: 140 MMOL/L (ref 135–146)
TIBC SERPL-MCNC: 364 MCG/DL (CALC) (ref 250–450)
VIT A SERPL-MCNC: 42 MCG/DL (ref 38–98)
VIT B1 BLD-SCNC: 203 NMOL/L (ref 78–185)
VIT B12 SERPL-MCNC: 1525 PG/ML (ref 200–1100)
WBC # BLD AUTO: 5.8 THOUSAND/UL (ref 3.8–10.8)
ZINC SERPL-MCNC: 75 MCG/DL (ref 60–130)

## 2024-01-25 ENCOUNTER — TELEPHONE (OUTPATIENT)
Dept: BARIATRICS | Facility: CLINIC | Age: 49
End: 2024-01-25

## 2024-01-25 NOTE — TELEPHONE ENCOUNTER
----- Message from PHAN Del Castillo sent at 1/24/2024  3:01 PM EST -----  Please call pt to let her know we have received her labs. They are all within limits except her b1 and b12 levels were elevated but this is NOT harmful. Please continue with her supplements as is.     One of the liver enzymes was slightly high.   Liver enzymes can be high for many reasons.  If you are experiencing right upper abdominal pain, please let us know/make a follow-up in the office.  Sometimes liver enzymes can be high when you carry fat around your liver. Levels can be high from certain medications and from viruses as well.  Levels can  be up when there may be liver or bone issues as well.    Excess amounts of tylenol/acetaminophen should be avoided. I recommend not taking more than 2000 mg of these products in a 24 hour period.    Alcohol intakes can also raise liver enzymes. Please avoid any of these things.     You should also review these levels with your PCP.

## 2024-01-29 ENCOUNTER — OFFICE VISIT (OUTPATIENT)
Dept: BARIATRICS | Facility: CLINIC | Age: 49
End: 2024-01-29
Payer: COMMERCIAL

## 2024-01-29 VITALS
WEIGHT: 149.5 LBS | TEMPERATURE: 97.8 F | HEIGHT: 60 IN | DIASTOLIC BLOOD PRESSURE: 76 MMHG | BODY MASS INDEX: 29.35 KG/M2 | SYSTOLIC BLOOD PRESSURE: 114 MMHG | HEART RATE: 79 BPM

## 2024-01-29 DIAGNOSIS — K91.2 POSTSURGICAL MALABSORPTION: ICD-10-CM

## 2024-01-29 DIAGNOSIS — Z48.815 ENCOUNTER FOR SURGICAL AFTERCARE FOLLOWING SURGERY OF DIGESTIVE SYSTEM: Primary | ICD-10-CM

## 2024-01-29 DIAGNOSIS — Z98.84 BARIATRIC SURGERY STATUS: ICD-10-CM

## 2024-01-29 PROCEDURE — 99213 OFFICE O/P EST LOW 20 MIN: CPT | Performed by: NURSE PRACTITIONER

## 2024-01-29 RX ORDER — OMEPRAZOLE 20 MG/1
20 CAPSULE, DELAYED RELEASE ORAL DAILY
Qty: 30 CAPSULE | Refills: 1 | Status: SHIPPED | OUTPATIENT
Start: 2024-01-29

## 2024-01-29 NOTE — PROGRESS NOTES
Assessment/Plan:     Patient ID: Winnie Matute is a 48 y.o. female.     Bariatric Surgery Status    -s/p Gisselle-En-Y Gastric Bypass with Dr. Andrade on 01/17/2023. Presents to the office today for annual visit. Overall doing well, tolerating a regular diet. Denies having any abdominal pain, N/V/D/C, regurgitation, reflux or dysphagia. Taking her MVI daily and PPI daily.    PLAN:     - taper off omeprazole over the next 6 weeks.   - Routine follow up in 6 months for 18 month PO visit.   - Continue with healthy lifestyle, adequate protein intake of 60 gm, fluid intake of at least 64 oz.   - Continue with MVI daily.   - Activity as tolerated.   - Labs ordered and will adjust accordingly if any deficiency.   - Follow up with RD and SW as needed.   .    Continued/Maintain healthy weight loss with good nutrition intakes.  Adequate hydration with at least 64oz. fluid intake.  Follow diet as discussed.  Follow vitamin and mineral recommendations as reviewed with you.  Exercise as tolerated.    Colonoscopy referral made: due - refused. Educated on importance  Mammogram - Due - refused as well.     Follow-up in 6 months for 18 month PO visit. We kindly ask that your arrive 15 minutes before your scheduled appointment time with your provider to allow our staff to room you, get your vital signs and update your chart.    Get lab work done prior to annual visit. Please call the office if you need a script.  It is recommended to check with your insurance BEFORE getting labs done to make sure they are covered by your policy.      Call our office if you have any problems with abdominal pain especially associated with fever, chills, nausea, vomiting or any other concerns.    All  Post-bariatric surgery patients should be aware that very small quantities of any alcohol can cause impairment and it is very possible not to feel the effect. The effect can be in the system for several hours.  It is also a stomach irritant.     It is advised to  AVOID alcohol, Nonsteroidal antiinflammatory drugs (NSAIDS) and nicotine of all forms . Any of these can cause stomach irritation/pain.    Discussed the effects of alcohol on a bariatric patient and the increased impairment risk.     Keep up the good work!     Postsurgical Malabsorption   -At risk for malabsorption of vitamins/minerals secondary to malabsorption and restriction of intake from bariatric surgery  -Currently taking adequate postop bariatric surgery vitamin supplementation  -Last set of bariatric labs completed on 01/2024 and showed WNL   -Patient received education about the importance of adhering to a lifelong supplementation regimen to avoid vitamin/mineral deficiencies      Diagnoses and all orders for this visit:    Encounter for surgical aftercare following surgery of digestive system    Bariatric surgery status  -     omeprazole (PriLOSEC) 20 mg delayed release capsule; Take 1 capsule (20 mg total) by mouth daily    Postsurgical malabsorption    BMI 29.0-29.9,adult         Subjective:      Patient ID: Winnie Matute is a 48 y.o. female.  -s/p Gisselle-En-Y Gastric Bypass with Dr. Andrade on 01/17/2023. Presents to the office today for annual visit. Overall doing well, tolerating a regular diet. Denies having any abdominal pain, N/V/D/C, regurgitation, reflux or dysphagia. Taking her MVI daily and PPI daily.    Initial: 248 lbs   Current: 149.5 lbs   EWL: (Weight loss is ahead of schedule at this post surgical period.)  Reilly: current   Current BMI is Body mass index is 29.2 kg/m².    Tolerating a regular diet-yes  Eating at least 60 grams of protein per day-yes  Following 30/60 minute rule with liquids-yes  Drinking at least 64 ounces of fluid per day-yes  Drinking carbonated beverages-no  Sufficient exercise-yes - going to the gym.   Using NSAIDs regularly-no  Using nicotine-no  Using alcohol-no  Supplements: Multivitamins, Iron, Calcium , and collagen powder  + VITRON C once daily.     EWL is 82%, which  places the patient ahead of schedule for expected post surgical weight loss at this time.     The following portions of the patient's history were reviewed and updated as appropriate: allergies, current medications, past family history, past medical history, past social history, past surgical history and problem list.    Review of Systems   Constitutional: Negative.    Respiratory: Negative.     Cardiovascular: Negative.    Gastrointestinal: Negative.    Musculoskeletal:  Positive for arthralgias and back pain.   Neurological: Negative.    Psychiatric/Behavioral: Negative.           Objective:    /76   Pulse 79   Temp 97.8 °F (36.6 °C) (Tympanic)   Ht 5' (1.524 m)   Wt 67.8 kg (149 lb 8 oz)   BMI 29.20 kg/m²      Physical Exam  Vitals and nursing note reviewed.   Constitutional:       Appearance: Normal appearance.   Cardiovascular:      Rate and Rhythm: Normal rate and regular rhythm.      Pulses: Normal pulses.      Heart sounds: Normal heart sounds.   Pulmonary:      Effort: Pulmonary effort is normal.      Breath sounds: Normal breath sounds.   Abdominal:      General: Bowel sounds are normal.      Palpations: Abdomen is soft.      Tenderness: There is no abdominal tenderness.   Musculoskeletal:         General: Normal range of motion.   Skin:     General: Skin is warm and dry.   Neurological:      General: No focal deficit present.      Mental Status: She is alert and oriented to person, place, and time.   Psychiatric:         Mood and Affect: Mood normal.         Behavior: Behavior normal.         Thought Content: Thought content normal.         Judgment: Judgment normal.

## 2024-06-10 ENCOUNTER — TELEPHONE (OUTPATIENT)
Dept: BARIATRICS | Facility: CLINIC | Age: 49
End: 2024-06-10

## 2024-06-10 ENCOUNTER — TELEPHONE (OUTPATIENT)
Age: 49
End: 2024-06-10

## 2024-06-10 NOTE — TELEPHONE ENCOUNTER
Patient interested in scheduling appt to meet with dietician in [place], patient can be reached at [phone].

## 2024-06-14 ENCOUNTER — TELEPHONE (OUTPATIENT)
Age: 49
End: 2024-06-14

## 2024-06-14 NOTE — TELEPHONE ENCOUNTER
Patient would like to schedule a post op RD appointment in the [place] office, if possible.  She has seen Shayna in [place] in the past but [place] would be more convenient for her.  Can someone please give her a call?  Thanks.

## 2024-06-22 NOTE — PROGRESS NOTES
Maternal and fetal status reassuring, no evidence of labor.  Pt observed for 2 hours.  Pt felt improved after oral hydration   Name         [place]: 7/21/22    Laura Kohler    Weight check 3/6  Starting weight 248 7  Last time weight 243 5  Today's weight 242 7    Surgery month:  Nov/Dec  Surgery deadline: March 809 [facility] Follow Up Note:        Mental health and wellness - Patient presents to the office today for weight check and support  She reports sleep has been well and her support systems are helpful with her having surgery  At times the patient explained when she goes out with friends and hears some went to [facility] pay 5,000 cash have the surgery come back and explained they do not have any recommendations and for her this is concerning  Mary educated the patient on reasons why the office takes time to educate before during and after surgery to keep patients safe  Mary explained focusing on her progress can decrease un-needed stress  Eating behaviors - Two meals a day  Hydration 32 oz of water in her  [place]) fills it with water  Needs a little flavor so she will   drink 32 oz-3 times a day  Sometimes snacks between he day with cheese or a (handful) of fruit  Activity -  Because of advanced arthritis hard for her to go out if its to hot so she has modified her yoga routine  Walks at least one time a week and tomasa a mile  Progress toward program requirements    Workflow:  · Psych and/or D+A Clearance:   · PCP Letter:[facility]- won't write letter until month before surgery   · Support Group: 6/8/22  · Surgeon Appt : June 24 th 2022  · EGD : pending  · Cardiac Risk Assessment: pending referral placed  · Sleep Studies:N/A  · Bloodwork: completed by PCP   · Nicotine test: n/a      Reviewed and discussed  Adequate hydration  Exercise  Meal planning and preparation  Lifestyle changes  Possible problems with poor eating habits  workflow    Goal: 1-Continuing to be mindful of healthy food choices and ways to cook to cut carb's for self     Next appointment: RD 8/17

## 2024-07-01 ENCOUNTER — CLINICAL SUPPORT (OUTPATIENT)
Dept: BARIATRICS | Facility: CLINIC | Age: 49
End: 2024-07-01

## 2024-07-01 DIAGNOSIS — K91.2 POSTSURGICAL MALABSORPTION: Primary | ICD-10-CM

## 2024-07-01 PROCEDURE — RECHECK

## 2024-07-01 NOTE — PROGRESS NOTES
Bariatric Follow Up Nutrition Note    Type of surgery  Gastric bypass: laparoscopic  Surgery Date: 1/16/2023  1.5 year +  post-op  Surgeon: Dr. Ramos Andrade    Nutrition Assessment   Winnie Matute  49 y.o.  female       Wt Readings from Last 3 Encounters:   01/29/24 67.8 kg (149 lb 8 oz)   07/28/23 72.1 kg (159 lb)   05/04/23 81 kg (178 lb 8 oz)     Estimated caloric intake at 1.5 months post op with ~ 3 days per week of exercise:  1645-3409 cals  Estimated protein needs at least 75gm protein  Estimated fluid needs 64 ounces (35 ml/kg IBW ) on average, but going away to [place] for one week so will work on increasing to ~100oz when she is there.     Weight on Day of Weight Loss Surgery: 224#  Weight in (lb) to have BMI = 25: 127.3 lbs   Post-Op Wt Loss: 95.2#/ 79% EBWL in 1.5 years    Review of History and Medications   Past Medical History:   Diagnosis Date    Arthritis     Asthma     Allergic    Bell's palsy     2013    Diabetes mellitus (HCC)     GERD (gastroesophageal reflux disease)     Hypercholesteremia     Hypertension     Migraines     Seasonal allergies      Past Surgical History:   Procedure Laterality Date    COSMETIC SURGERY      Face-birth pia removal    DILATION AND CURETTAGE OF UTERUS      EGD      MN LAPS GSTR RSTCV PX W/BYP BALJINDER-EN-Y LIMB <150 CM N/A 1/16/2023    Procedure: BYPASS GASTRIC  R-N-Y LAP , intra-op  EGD;  Surgeon: Ramos Andrade MD;  Location: [facility] OR;  Service: Bariatrics    WISDOM TOOTH EXTRACTION       Social History     Socioeconomic History    Marital status:      Spouse name: Not on file    Number of children: Not on file    Years of education: Not on file    Highest education level: Not on file   Occupational History    Not on file   Tobacco Use    Smoking status: Never    Smokeless tobacco: Never   Vaping Use    Vaping status: Never Used   Substance and Sexual Activity    Alcohol use: Not Currently     Comment: socially/rare    Drug use: Never    Sexual activity:  Not on file   Other Topics Concern    Not on file   Social History Narrative    Not on file     Social Determinants of Health     Financial Resource Strain: Not on file   Food Insecurity: Not on file   Transportation Needs: Not on file   Physical Activity: Not on file   Stress: Not on file   Social Connections: Not on file   Intimate Partner Violence: Not on file   Housing Stability: Not on file       Current Outpatient Medications:     acetaminophen (TYLENOL) 160 mg/5 mL liquid, , Disp: , Rfl:     albuterol (PROVENTIL HFA,VENTOLIN HFA) 90 mcg/act inhaler, TAKE 2 PUFFS BY MOUTH EVERY 6 HOURS AS NEEDED FOR WHEEZE, Disp: , Rfl:     budesonide (Pulmicort Flexhaler) 90 MCG/ACT inhaler, Inhale 1 puff 2 (two) times a day Rinse mouth after use., Disp: , Rfl:     Calcium Citrate-Vitamin D 500-12.5 MG-MCG PACK, Take 1 tablet by mouth Three times a day, Disp: , Rfl:     fenofibrate micronized (LOFIBRA) 200 MG capsule, Take 200 mg by mouth daily (Patient not taking: Reported on 1/26/2023), Disp: , Rfl:     Multiple Vitamin (MULTIVITAMIN ADULT PO), Take 1 tablet by mouth daily, Disp: , Rfl:     omeprazole (PriLOSEC) 20 mg delayed release capsule, Take 1 capsule (20 mg total) by mouth daily, Disp: 30 capsule, Rfl: 1    rizatriptan (MAXALT) 5 MG tablet, Take 5 mg by mouth once as needed for migraine May repeat in 2 hours if needed, Disp: , Rfl:     topiramate (TOPAMAX) 25 mg tablet, Take one to three pills by mouth in the evening as directed w plenty of water, Disp: , Rfl:     Food Intake and Lifestyle Assessment   Food Intake Assessment completed via usual diet recall  [place]akes:  5:30-6am gym 3-4 days per week  Protein shake (Premier)  B: will eat breakfast when working. Aims for 20gm protein:  Greek yogurt + fruit + cheese and turkey dover.  L:12pm-short cuts 3oz chicken + bagged salad 1/3 cup + fruit   1/2 Protein shake with coffee (1/2 caf)  D:  5-6pm: chicken or meatballs (aims for 3oz) OR if does pasta will be high fiber or  high protein pasta but just a couple.   S:  8pm-skinny pop snack bag OR enlighten bar OR build protein puff in between julien cracker but not too often because too sweet or roasted chick peas     Beverage intake: water  Diet texture/stage: regular  Protein supplement: one per day   Estimated protein intake per day: ~60-70 grams per day pt log in baritaic   Estimated fluid intake per day: 64oz + 16oz at night + 2 protein shake (1/2 to 1 of the shakes is is with 1/2 caf coffee).   Meals eaten away from home: n/a  Typical meal pattern: 2-3 meals per day and 0 snacks per day  Eating Behaviors: Appropriate diet advancement, Appropriate portion sizes and Does not drink with meals and waits 30-minutes after meal before resuming drinking    Vitamin and Mineral regimen: reviewed with NP at last visit     Food allergies or intolerances: some difficulty with salad- feels full quickly  Has some rice at a party that was uncomfortable     Cultural or Spiritism considerations: N/A    Lab Results   Component Value Date    HGBA1C 5.2 05/17/2024         Physical Assessment  Nutrition Related Findings  Loss of Hunger    Physical Activity  Types of exercise: gym--legs one day (machines) and arms (chest flies, pull downs, etc) another day + 20 mins cardio. Has stuff could do at home on off days.  Goes to gym several times per week--4 times per week  Current physical limitations: none currently     Psychosocial Assessment   Support systems: son   Socioeconomic factors: works full time     Nutrition Diagnosis- improving since surgery   Diagnosis: Overweight / Obesity (NC-3.3)  Related to: Physical inactivity and Excessive energy intake  As Evidenced by: BMI >25     Nutrition Prescription: Recommend the following diet  5809-2860 kcal per day / 75 grams protein   3 meals per day  1.5-2 protein shake per day   1-2 snacks    Interventions and Teaching   Patient educated on post-op nutrition guidelines.       Patient educated and handouts  provided.  Capacity of post-surgery stomach  Diet progression  Adequate hydration  Expected weight loss  Exercise  Nutrition considerations after surgery  Protein supplements  Appropriate carbohydrate, protein, and fat intake, and food/fluid choices to maximize safe weight loss, nutrient intake, and tolerance   Dietary and lifestyle changes  Intuitive eating  Techniques for self monitoring and keeping daily food journal  Potential for food intolerance after surgery, and ways to deal with them including: lactose intolerance, nausea, reflux, vomiting, diarrhea, food intolerance, appetite changes, gas    Education provided to: patient    Barriers to learning: No barriers identified    Readiness to change: action    Comprehension: demonstrated understanding and verbalizes understanding     Expected Compliance: good    Evaluation/Monitoring   Eating pattern as discussed Tolerance of nutrition prescription Body weight Lab values Physical activity Bowel pattern    Pt presents today about 1.5 s/p RYGB with excellent weight loss.She did regain a little from he anurag. Pt is most concerned, at this time, with the fact that she is traveling to [place] for one week and she is concerned about the altitude difference and was wondering if there were any nutrition/hydration changes she needed to make while she was there. Encouraged higher fluid intake and suggested to try the SF liquid IV in her water. Suggested about 100oz per day.    Did review her diet recall and noted pt is drinking 1.5 protein shake per day still. She is going to the gym doing both cardio and strength, therefore, recommend about 1200 calories per day (200, 300, 400 cals for breakfast, lunch and dinner and two 150 calorie snacks per day--protein shakes should be counted as a snack at this time) if she wanted to start losing a little weight again. Did also mention checking a body comp. Pt states she has done it at the gym before. Suggested to f/u with the body  comps to see if there has been any changes in her body composition since starting exercising.  Pt would like to f/u in about 2 months.    Goals  Food journal via Rafter  Eat 3 meals per day   Food log 1200 cals (with current exercise regimen), 75 grams of protein per day   Limit to two 150 calorie snacks per day (protein shake would count as a snack since eating 3 meals)  Continue with gym 3-4 times per week   When away in [place] for the week suggested SF liquid IV (or Zero gatorade, powerade zero) and about 100oz water    Time Spent:   45 Minutes

## 2024-07-30 PROBLEM — K91.2 POSTSURGICAL MALABSORPTION: Status: ACTIVE | Noted: 2024-07-30

## 2024-07-30 PROBLEM — Z48.815 ENCOUNTER FOR SURGICAL AFTERCARE FOLLOWING SURGERY OF DIGESTIVE SYSTEM: Status: ACTIVE | Noted: 2024-07-30

## 2024-07-30 NOTE — ASSESSMENT & PLAN NOTE
-Avoid fried foods and trans fat, limit saturated fats and refined carbohydrates  -Increase fish/omega 3 FA consumption  -Increase physical activity  -lipid panel in May WNL; repeat in 6 months or per PCP

## 2024-07-30 NOTE — ASSESSMENT & PLAN NOTE
-At risk for malabsorption of vitamins/minerals secondary to malabsorption and restriction of intake from bariatric surgery  -NOT Currently taking adequate postop bariatric surgery vitamin supplementation: Lonny Pal MVI with 65mg iron, calcium citrate 500mg TID, 1 Vitron C    -Last Labs 6 months ago - ferritin 19 - repeat now    -CBC/Metabolic panel due in 6 months  -Patient received education about the importance of adhering to a lifelong supplementation regimen to avoid vitamin/mineral deficiencies

## 2024-07-30 NOTE — ASSESSMENT & PLAN NOTE
-s/p Gisselle-En-Y Gastric Bypass with Dr. Andrade on 01/16/23. Overall doing very well still slowly losing weight and feeling great. Continue excellent progress and f/u in 6 months.    Initial: 248 lbs   Current: 1582.8lbs  EWL: 79%  Reilly: current  Current BMI is Body mass index is 29.84 kg/m².    Tolerating a regular diet-yes  Eating at least 60 grams of protein per day-yes  Following 30/60 minute rule with liquids-yes  Drinking at least 64 ounces of fluid per day-yes  Drinking carbonated beverages-no  Sufficient exercise-yes  Using NSAIDs regularly-no  Using nicotine-no  Using alcohol-no. Advised about the risks of alcohol s/p bariatric surgery and recommend avoiding all alcohol

## 2024-08-01 ENCOUNTER — OFFICE VISIT (OUTPATIENT)
Dept: BARIATRICS | Facility: CLINIC | Age: 49
End: 2024-08-01

## 2024-08-01 VITALS
WEIGHT: 152.8 LBS | SYSTOLIC BLOOD PRESSURE: 110 MMHG | HEART RATE: 81 BPM | HEIGHT: 60 IN | DIASTOLIC BLOOD PRESSURE: 76 MMHG | BODY MASS INDEX: 30 KG/M2

## 2024-08-01 DIAGNOSIS — K91.2 POSTSURGICAL MALABSORPTION: ICD-10-CM

## 2024-08-01 DIAGNOSIS — E78.2 MIXED HYPERLIPIDEMIA: ICD-10-CM

## 2024-08-01 DIAGNOSIS — Z48.815 ENCOUNTER FOR SURGICAL AFTERCARE FOLLOWING SURGERY OF DIGESTIVE SYSTEM: Primary | ICD-10-CM

## 2024-08-01 DIAGNOSIS — R79.0 LOW FERRITIN: ICD-10-CM

## 2024-08-01 PROCEDURE — 99214 OFFICE O/P EST MOD 30 MIN: CPT | Performed by: PHYSICIAN ASSISTANT

## 2024-08-01 NOTE — PROGRESS NOTES
Assessment/Plan:    Encounter for surgical aftercare following surgery of digestive system  -s/p Gisselle-En-Y Gastric Bypass with Dr. Andrade on 01/16/23. Overall doing very well still slowly losing weight and feeling great. Continue excellent progress and f/u in 6 months.    Initial: 248 lbs   Current: 1582.8lbs  EWL: 79%  Reilly: current  Current BMI is Body mass index is 29.84 kg/m².    Tolerating a regular diet-yes  Eating at least 60 grams of protein per day-yes  Following 30/60 minute rule with liquids-yes  Drinking at least 64 ounces of fluid per day-yes  Drinking carbonated beverages-no  Sufficient exercise-yes  Using NSAIDs regularly-no  Using nicotine-no  Using alcohol-no. Advised about the risks of alcohol s/p bariatric surgery and recommend avoiding all alcohol      Postsurgical malabsorption  -At risk for malabsorption of vitamins/minerals secondary to malabsorption and restriction of intake from bariatric surgery  -NOT Currently taking adequate postop bariatric surgery vitamin supplementation: Lonny Pal MVI with 65mg iron, calcium citrate 500mg TID, 1 Vitron C    -Last Labs 6 months ago - ferritin 19 - repeat now    -CBC/Metabolic panel due in 6 months  -Patient received education about the importance of adhering to a lifelong supplementation regimen to avoid vitamin/mineral deficiencies       Mixed hyperlipidemia  -Avoid fried foods and trans fat, limit saturated fats and refined carbohydrates  -Increase fish/omega 3 FA consumption  -Increase physical activity  -lipid panel in May WNL; repeat in 6 months or per PCP     Diagnoses and all orders for this visit:    Encounter for surgical aftercare following surgery of digestive system    Postsurgical malabsorption  -     CBC and differential; Future  -     Iron Panel (Includes Ferritin, Iron Sat%, Iron, and TIBC); Future  -     CBC and differential; Future  -     Comprehensive metabolic panel; Future  -     Folate; Future  -     Hemoglobin A1C; Future  -      Iron Panel (Includes Ferritin, Iron Sat%, Iron, and TIBC); Future  -     Vitamin A; Future  -     TSH, 3rd generation with Free T4 reflex; Future  -     Lipid panel; Future  -     PTH, intact; Future  -     Zinc; Future  -     Vitamin D 25 hydroxy; Future  -     Vitamin B12; Future  -     Vitamin B1, whole blood; Future    Mixed hyperlipidemia  -     Lipid panel; Future    Low ferritin  -     CBC and differential; Future  -     Iron Panel (Includes Ferritin, Iron Sat%, Iron, and TIBC); Future  -     CBC and differential; Future  -     Iron Panel (Includes Ferritin, Iron Sat%, Iron, and TIBC); Future          Subjective:      Patient ID: Winnie Matute is a 49 y.o. female.    -s/p Gisselle-En-Y Gastric Bypass with Dr. Andrade on 01/16/23. Presents to the office today for routine follow up. Tolerating diet without issues; denies N/V, dysphagia, reflux. Overall doing very well. Going to the gym regularly.    Works for the [employer] - mostly remotely.. Has 3 grown kids.    Diet Recall:   B - protein/coffee  Snack - Greek yogurt and sometimes turkey dover and fruit  L - chicken and green salad and sometimes HB egg   D - sausage and veggies or meatloaf muffin or protein plus pasta small amount and veggies  HS - greek yogurt small portion low CHO bar    Fluids - 64oz water, 1 coffee/protein shake    The following portions of the patient's history were reviewed and updated as appropriate: allergies, current medications, past family history, past medical history, past social history, past surgical history and problem list.    Review of Systems   Constitutional:  Negative for chills and fever. Unexpected weight change: planned weight loss.  HENT:  Negative for trouble swallowing.    Respiratory:  Negative for cough and shortness of breath.    Cardiovascular:  Negative for chest pain and palpitations.   Gastrointestinal:  Negative for abdominal pain, constipation, diarrhea, nausea and vomiting.   Musculoskeletal:  Positive for arthralgias.    Neurological:  Negative for dizziness and light-headedness.   Psychiatric/Behavioral:          Denies anxiety and depression         Objective:      /76 (BP Location: Right arm, Patient Position: Sitting, Cuff Size: Standard)   Pulse 81   Ht 5' (1.524 m)   Wt 69.3 kg (152 lb 12.8 oz)   BMI 29.84 kg/m²     Colonoscopy-Completed       Physical Exam  Vitals reviewed.   Constitutional:       General: She is not in acute distress.     Appearance: She is well-developed.   HENT:      Head: Normocephalic and atraumatic.   Eyes:      General: No scleral icterus.  Cardiovascular:      Rate and Rhythm: Normal rate and regular rhythm.   Pulmonary:      Effort: Pulmonary effort is normal. No respiratory distress.   Abdominal:      General: There is no distension.      Palpations: Abdomen is soft.   Skin:     General: Skin is warm and dry.   Neurological:      Mental Status: She is alert.   Psychiatric:         Mood and Affect: Mood normal.         Behavior: Behavior normal.           BARRIERS: none identified    GOALS:   Continued/Maintain healthy weight loss with good nutrition intakes.  Adequate hydration with at least 64oz. fluid intake.  Normal vitamin and mineral levels.  Exercise as tolerated.  Curaphen Tumeric Supplement - [employer]    Follow-up in 1 year. We kindly ask that your arrive 15 minutes before your scheduled appointment time with your provider to allow our staff to room you, get your vital signs and update your chart.    Follow diet as discussed.      Get lab work done in the next 2 weeks.  You have been given a lab slip today.  Please call the office if you need a replacement.  It is recommended to check with your insurance BEFORE getting labs done to make sure they are covered by your policy.  Also, please check with your PCP and other providers before getting labs to avoid duplicate labs. Make sure to HOLD any multivitamins that may contain biotin and any biotin supplements FOR 5 DAYS before any  labs since it can affect the results.    Follow vitamin and mineral recommendations as reviewed with you.    Call our office if you have any problems with abdominal pain especially associated with fever, chills, nausea, vomiting or any other concerns.    All  Post-bariatric surgery patients should be aware that very small quantities of any alcohol can cause impairment and it is very possible not to feel the effect. The effect can be in the system for several hours.  It is also a stomach irritant.     It is advised to AVOID alcohol, Nonsteroidal antiinflammatory drugs (NSAIDS) and nicotine of all forms . Any of these can cause stomach irritation/pain.

## 2024-09-12 ENCOUNTER — CLINICAL SUPPORT (OUTPATIENT)
Dept: BARIATRICS | Facility: CLINIC | Age: 49
End: 2024-09-12

## 2024-09-12 VITALS — BODY MASS INDEX: 30.63 KG/M2 | HEIGHT: 60 IN | WEIGHT: 156 LBS

## 2024-09-12 DIAGNOSIS — K91.2 POSTSURGICAL MALABSORPTION: Primary | ICD-10-CM

## 2024-09-12 PROCEDURE — RECHECK

## 2024-09-12 NOTE — PROGRESS NOTES
Bariatric Follow Up Nutrition Note    Type of surgery  Gastric bypass: laparoscopic  Surgery Date: 1/16/2023  1 year 8 months+  post-op  Surgeon: Dr. Ramos Andrade    Nutrition Assessment   Winnie Matute  49 y.o.  female       Wt Readings from Last 3 Encounters:   09/12/24 70.8 kg (156 lb)   08/01/24 69.3 kg (152 lb 12.8 oz)   01/29/24 67.8 kg (149 lb 8 oz)     Estimated caloric intake at 1.5 months post op with ~ 3 days per week of exercise:  6734-6119 cals  Estimated protein needs at least 75gm protein  Estimated fluid needs 64 ounces (35 ml/kg IBW ) on average    Weight on Day of Weight Loss Surgery: 224#  Weight in (lb) to have BMI = 25: 127.3 lbs   Post-Op Wt Loss: 92.8#/ 76% EBWL in 1 years 8 months    Review of History and Medications   Past Medical History:   Diagnosis Date    Arthritis     Asthma     Allergic    Bell's palsy     2013    Diabetes mellitus (HCC)     GERD (gastroesophageal reflux disease)     Hypercholesteremia     Hypertension     Migraines     Seasonal allergies      Past Surgical History:   Procedure Laterality Date    COSMETIC SURGERY      Face-birth pia removal    DILATION AND CURETTAGE OF UTERUS      EGD      ND LAPS GSTR RSTCV PX W/BYP BALJINDER-EN-Y LIMB <150 CM N/A 1/16/2023    Procedure: BYPASS GASTRIC  R-N-Y LAP , intra-op  EGD;  Surgeon: Ramos Andrade MD;  Location: [facility] OR;  Service: Bariatrics    WISDOM TOOTH EXTRACTION       Social History     Socioeconomic History    Marital status:      Spouse name: Not on file    Number of children: Not on file    Years of education: Not on file    Highest education level: Not on file   Occupational History    Not on file   Tobacco Use    Smoking status: Never    Smokeless tobacco: Never   Vaping Use    Vaping status: Never Used   Substance and Sexual Activity    Alcohol use: Not Currently     Comment: socially/rare    Drug use: Never    Sexual activity: Not on file   Other Topics Concern    Not on file   Social History Narrative     Not on file     Social Determinants of Health     Financial Resource Strain: Not on file   Food Insecurity: Not on file   Transportation Needs: Not on file   Physical Activity: Not on file   Stress: Not on file   Social Connections: Not on file   Intimate Partner Violence: Not on file   Housing Stability: Not on file       Current Outpatient Medications:     acetaminophen (TYLENOL) 160 mg/5 mL liquid, , Disp: , Rfl:     albuterol (PROVENTIL HFA,VENTOLIN HFA) 90 mcg/act inhaler, TAKE 2 PUFFS BY MOUTH EVERY 6 HOURS AS NEEDED FOR WHEEZE (Patient not taking: Reported on 8/1/2024), Disp: , Rfl:     budesonide (Pulmicort Flexhaler) 90 MCG/ACT inhaler, Inhale 1 puff 2 (two) times a day Rinse mouth after use. (Patient not taking: Reported on 8/1/2024), Disp: , Rfl:     Calcium Citrate-Vitamin D 500-12.5 MG-MCG PACK, Take 1 tablet by mouth Three times a day, Disp: , Rfl:     fenofibrate micronized (LOFIBRA) 200 MG capsule, Take 200 mg by mouth daily (Patient not taking: Reported on 1/26/2023), Disp: , Rfl:     Multiple Vitamin (MULTIVITAMIN ADULT PO), Take 1 tablet by mouth daily, Disp: , Rfl:     omeprazole (PriLOSEC) 20 mg delayed release capsule, Take 1 capsule (20 mg total) by mouth daily (Patient not taking: Reported on 8/1/2024), Disp: 30 capsule, Rfl: 1    rizatriptan (MAXALT) 5 MG tablet, Take 5 mg by mouth once as needed for migraine May repeat in 2 hours if needed, Disp: , Rfl:     topiramate (TOPAMAX) 25 mg tablet, Take one to three pills by mouth in the evening as directed w plenty of water, Disp: , Rfl:     Food Intake and Lifestyle Assessment   Food Intake Assessment completed via [email or web address]--logging about 1000-1500cals, 109-140g pro--most often about 8001-0724 cals  Wakes:  5:30-6am gym 3-4 days per week  6am-Protein shake (Premier) at gym  B: 7:30-8am crackers, berries, cheese, turkey dover 2 slices, boiled egg, non-fat greek dannon light and fit. (400cals)  L:12pm-salad with chicken OR higher lunch at times  up to 400cals  1pm-1/2-1 Protein shake with coffee (1/2 caf)  D:  5pm: chicken or meatballs (aims for 3oz) OR if does pasta will be high fiber or high protein pasta but just a couple OR chicken sausage link + veggie OR One day did have [facility] saucy nuggets (1/2 one day and 1/2 another) so usually have been ranging from 200-500cals  S:  8pm-skinny pop snack bag OR enlighten bar OR build protein puff in between julien cracker but not too often because too sweet or roasted chick peas     Beverage intake: water  Diet texture/stage: regular  Protein supplement: one per day   Estimated protein intake per day: ~60-70 grams per day pt log in baritastic   Estimated fluid intake per day: 64oz + 16oz at night + 2 protein shake (1/2 to 1 of the shakes is is with 1/2 caf coffee).   Meals eaten away from home: n/a  Typical meal pattern: 2-3 meals per day and 0 snacks per day  Eating Behaviors: Appropriate diet advancement, Appropriate portion sizes and Does not drink with meals and waits 30-minutes after meal before resuming drinking    Vitamin and Mineral regimen: reviewed with NP at last visit     Food allergies or intolerances: some difficulty with salad- feels full quickly  Has some rice at a party that was uncomfortable     Cultural or Catholic considerations: N/A    Lab Results   Component Value Date    HGBA1C 5.2 05/17/2024       Physical Assessment  Nutrition Related Findings  Loss of Hunger    Physical Activity  Types of exercise: gym--legs one day (machines) and arms (chest flies, pull downs, etc) another day + 20 mins cardio. Has stuff and could do at home on off days too  Goes to gym several times per week--4 times per week  Current physical limitations: none currently     Psychosocial Assessment   Support systems: son   Socioeconomic factors: works full time     Nutrition Diagnosis- improving since surgery   Diagnosis: Overweight / Obesity (NC-3.3)  Related to: Physical inactivity and Excessive energy intake  As  Evidenced by: BMI >25     Nutrition Prescription: Recommend the following diet  2220-0671 kcal per day / 75 grams protein   3 meals per day  1.5-2 protein shake per day   1-2 snacks    Interventions and Teaching   Patient educated on post-op nutrition guidelines.       Patient educated and handouts provided.  Capacity of post-surgery stomach  Diet progression  Adequate hydration  Expected weight loss  Exercise  Nutrition considerations after surgery  Protein supplements  Appropriate carbohydrate, protein, and fat intake, and food/fluid choices to maximize safe weight loss, nutrient intake, and tolerance   Dietary and lifestyle changes  Intuitive eating  Techniques for self monitoring and keeping daily food journal  Potential for food intolerance after surgery, and ways to deal with them including: lactose intolerance, nausea, reflux, vomiting, diarrhea, food intolerance, appetite changes, gas    Education provided to: patient    Barriers to learning: No barriers identified    Readiness to change: action    Comprehension: demonstrated understanding and verbalizes understanding     Expected Compliance: good    Evaluation/Monitoring   Eating pattern as discussed Tolerance of nutrition prescription Body weight Lab values Physical activity Bowel pattern    Pt presents today about 1.5 s/p RYGB with excellent weight loss.She did regain a little from he anurag and with a 3# weight gain in the past month. Reviewed food log and she appears to be consuming closer to 4979-9853 calories on average.  She also reports at times where she is getting dizzy and extremely hungry.  Reviewed possible different reasons such as reactive hypoglycemia or too long between meals.  From food log it appears going 4hrs without a meal/snack may be too long for her. Also discussed to ensure protein at each meal and moderate carb. Did some menu planning to help her better meet 2296-4102 calories per day (at gym-160gm protein shake, B-200-300 selwyn, 100  selwyn snack, 300 L, 100 selwyn snack and optional protien shake in afternoon with coffee, [id number]).    Goals  Food journal via [email or web address]  Eat 3 meals per day   Food log 1200 cals (with current exercise regimen), 75 grams of protein per day   Continue with gym 3-4 times per week   Suggested meal rx:  at gym-160cal protein shake, B-200-300 selwyn, 100 selwyn snack, 300 L, 100 selwyn snack and optional protien shake in afternoon with coffee, [id number]).  F/U 1 month    Time Spent:   45 Minutes

## 2024-10-24 ENCOUNTER — CLINICAL SUPPORT (OUTPATIENT)
Dept: BARIATRICS | Facility: CLINIC | Age: 49
End: 2024-10-24

## 2024-10-24 VITALS — WEIGHT: 158 LBS | HEIGHT: 60 IN | BODY MASS INDEX: 31.02 KG/M2

## 2024-10-24 DIAGNOSIS — K91.2 POSTSURGICAL MALABSORPTION: Primary | ICD-10-CM

## 2024-10-24 PROCEDURE — RECHECK

## 2024-10-24 NOTE — PROGRESS NOTES
Bariatric Follow Up Nutrition Note    Type of surgery  Gastric bypass: laparoscopic  Surgery Date: 1/16/2023  1 year 9 months+  post-op  Surgeon: Dr. Ramos Andrade    Nutrition Assessment   Winnie Matute  49 y.o.  female       Wt Readings from Last 3 Encounters:   10/24/24 71.7 kg (158 lb)   09/12/24 70.8 kg (156 lb)   08/01/24 69.3 kg (152 lb 12.8 oz)     Estimated caloric intake at 1.5 year post op with ~ 3 days per week of exercise (1# per week):  1200 cals  Estimated protein needs at least 75gm protein  Estimated fluid needs 64 ounces (35 ml/kg IBW ) on average    Weight on Day of Weight Loss Surgery: 224#  Weight in (lb) to have BMI = 25: 127.3 lbs   Post-Op Wt Loss: 92.8#/ 76% EBWL in 1 years 8 months    Review of History and Medications   Past Medical History:   Diagnosis Date    Arthritis     Asthma     Allergic    Bell's palsy     2013    Diabetes mellitus (HCC)     GERD (gastroesophageal reflux disease)     Hypercholesteremia     Hypertension     Migraines     Seasonal allergies      Past Surgical History:   Procedure Laterality Date    COSMETIC SURGERY      Face-birth pia removal    DILATION AND CURETTAGE OF UTERUS      EGD      CO LAPS GSTR RSTCV PX W/BYP BALJINDER-EN-Y LIMB <150 CM N/A 1/16/2023    Procedure: BYPASS GASTRIC  R-N-Y LAP , intra-op  EGD;  Surgeon: Ramos Andrade MD;  Location: [facility];  Service: Bariatrics    WISDOM TOOTH EXTRACTION       Social History     Socioeconomic History    Marital status:      Spouse name: Not on file    Number of children: Not on file    Years of education: Not on file    Highest education level: Not on file   Occupational History    Not on file   Tobacco Use    Smoking status: Never    Smokeless tobacco: Never   Vaping Use    Vaping status: Never Used   Substance and Sexual Activity    Alcohol use: Not Currently     Comment: socially/rare    Drug use: Never    Sexual activity: Not on file   Other Topics Concern    Not on file   Social History Narrative     Not on file     Social Determinants of Health     Financial Resource Strain: Not on file   Food Insecurity: Not on file   Transportation Needs: Not on file   Physical Activity: Not on file   Stress: Not on file   Social Connections: Not on file   Intimate Partner Violence: Not on file   Housing Stability: Not on file       Current Outpatient Medications:     acetaminophen (TYLENOL) 160 mg/5 mL liquid, , Disp: , Rfl:     albuterol (PROVENTIL HFA,VENTOLIN HFA) 90 mcg/act inhaler, TAKE 2 PUFFS BY MOUTH EVERY 6 HOURS AS NEEDED FOR WHEEZE (Patient not taking: Reported on 8/1/2024), Disp: , Rfl:     budesonide (Pulmicort Flexhaler) 90 MCG/ACT inhaler, Inhale 1 puff 2 (two) times a day Rinse mouth after use. (Patient not taking: Reported on 8/1/2024), Disp: , Rfl:     Calcium Citrate-Vitamin D 500-12.5 MG-MCG PACK, Take 1 tablet by mouth Three times a day, Disp: , Rfl:     fenofibrate micronized (LOFIBRA) 200 MG capsule, Take 200 mg by mouth daily (Patient not taking: Reported on 1/26/2023), Disp: , Rfl:     Multiple Vitamin (MULTIVITAMIN ADULT PO), Take 1 tablet by mouth daily, Disp: , Rfl:     omeprazole (PriLOSEC) 20 mg delayed release capsule, Take 1 capsule (20 mg total) by mouth daily (Patient not taking: Reported on 8/1/2024), Disp: 30 capsule, Rfl: 1    rizatriptan (MAXALT) 5 MG tablet, Take 5 mg by mouth once as needed for migraine May repeat in 2 hours if needed, Disp: , Rfl:     topiramate (TOPAMAX) 25 mg tablet, Take one to three pills by mouth in the evening as directed w plenty of water, Disp: , Rfl:     Food Intake and Lifestyle Assessment   Food Intake Assessment completed via [employer]--  Wakes:  5:30-6am gym 3-4 days per week  6am-Protein shake (Premier) at gym  B: 7:30-8am dannon light and fit greek + 1/2 scoop pro powder + 2 slices of turkey dover   L:12pm- sliced apple + 1/4oz of kory cheese + 3 slices of turkey breast (2oz) + 2 slices of 40 selwyn bread  1pm-1/2-1 Protein shake with coffee (1/2  caf)  D:  5pm: protein pasta + chicken breast + toast OR chicken or meatballs (aims for 3oz) OR if does pasta will be high fiber or high protein pasta but just a couple OR one night went to [employer] small morris + cheeseburger   S:  8pm-skinny pop snack bag OR enlighten bar OR build protein puff in between julien cracker but not too often because too sweet or roasted chick peas     Beverage intake: water  Diet texture/stage: regular  Protein supplement: one per day   Estimated protein intake per day:  grams per day pt log in baritastic   Estimated fluid intake per day: 64oz + 16oz at night + 1.5 protein shake   Meals eaten away from home: n/a  Typical meal pattern: 2-3 meals per day and 0 snacks per day  Eating Behaviors: Appropriate diet advancement, Appropriate portion sizes and Does not drink with meals and waits 30-minutes after meal before resuming drinking    Vitamin and Mineral regimen: reviewed with NP at last visit     Food allergies or intolerances: some difficulty with salad- feels full quickly  Has some rice at a party that was uncomfortable     Cultural or Adventist considerations: N/A    Lab Results   Component Value Date    HGBA1C 5.2 05/17/2024       Physical Assessment  Nutrition Related Findings  Loss of Hunger    Physical Activity  Types of exercise: gym--legs one day (machines) and arms (chest flies, pull downs, etc) another day + 20 mins cardio. Has stuff and could do at home on off days too.  Did have a reassessment with her  and did a body comp yesterday which was lower than today.   Goes to gym several times per week--4 times per week  Current physical limitations: none currently     Psychosocial Assessment   Support systems: son   Socioeconomic factors: works full time     Nutrition Diagnosis- improving since surgery   Diagnosis: Overweight / Obesity (NC-3.3)  Related to: Physical inactivity and Excessive energy intake  As Evidenced by: BMI >25     Nutrition Prescription:  Recommend the following diet  1200 kcal per day / 75 grams protein   3 meals per day  1.5-2 protein shake per day   1-2 snacks    Interventions and Teaching   Patient educated on post-op nutrition guidelines.       Patient educated and handouts provided.  Capacity of post-surgery stomach  Diet progression  Adequate hydration  Expected weight loss  Exercise  Nutrition considerations after surgery  Protein supplements  Appropriate carbohydrate, protein, and fat intake, and food/fluid choices to maximize safe weight loss, nutrient intake, and tolerance   Dietary and lifestyle changes  Intuitive eating  Techniques for self monitoring and keeping daily food journal  Potential for food intolerance after surgery, and ways to deal with them including: lactose intolerance, nausea, reflux, vomiting, diarrhea, food intolerance, appetite changes, gas    Education provided to: patient    Barriers to learning: No barriers identified    Readiness to change: action    Comprehension: demonstrated understanding and verbalizes understanding     Expected Compliance: good    Evaluation/Monitoring   Eating pattern as discussed Tolerance of nutrition prescription Body weight Lab values Physical activity Bowel pattern    Pt presents today about 1.5 s/p RYGB with excellent weight loss.Pt did gain about 2lbs from last visit. We reviewed her food log and it appears she is logging about 4810-0625 calories per day and adequate protein but as we discussed there were times she didn't log everything she ate. Reminded pt little extras could add up t weight maintenance.  Pt continues to exercise and is working with a . She recently did a body comp at her gym but doesn't remember the results.  Suggested to keep tracking the body comp as she continues her exercise regimen.      Pt reports she has recognized that when gets low blood sugars it is when she has gone too long w/o eating.  Pt is being more conscience of eating consistently.       Goals  Food journal via [employer]  Eat 3 meals per day   Food log 1200 cals (with current exercise regimen), 75 grams of protein per day   Continue with gym 3-4 times per week   Suggested meal rx:  at gym-160cal protein shake, B-200-300 selwyn, 100 selwyn snack, 300 L, 100 selwyn snack and optional protien shake in afternoon with coffee, [id number]).  F/U 1 month    Time Spent:   30 Minutes

## 2024-11-22 ENCOUNTER — CLINICAL SUPPORT (OUTPATIENT)
Dept: BARIATRICS | Facility: CLINIC | Age: 49
End: 2024-11-22

## 2024-11-22 VITALS — WEIGHT: 155.2 LBS | HEIGHT: 60 IN | BODY MASS INDEX: 30.47 KG/M2

## 2024-11-22 DIAGNOSIS — K91.2 POSTSURGICAL MALABSORPTION: Primary | ICD-10-CM

## 2024-11-22 PROCEDURE — RECHECK

## 2024-11-22 NOTE — PROGRESS NOTES
Bariatric Follow Up Nutrition Note    Type of surgery  Gastric bypass: laparoscopic  Surgery Date: 1/16/2023  1 year 10 months+  post-op  Surgeon: Dr. Ramos Andrade    Nutrition Assessment   Winnie Matute  49 y.o.  female       Wt Readings from Last 3 Encounters:   11/22/24 70.4 kg (155 lb 3.2 oz)   10/24/24 71.7 kg (158 lb)   09/12/24 70.8 kg (156 lb)     Estimated caloric intake at 1.5 year post op with ~ 3 days per week of exercise (1# per week):  1200 cals  Estimated protein needs at least 75gm protein  Estimated fluid needs 64 ounces (35 ml/kg IBW ) on average    Weight on Day of Weight Loss Surgery: 224#  Weight in (lb) to have BMI = 25: 127.3 lbs   Post-Op Wt Loss: 93.7#/ 77% EBWL in 1 years 10 months    Review of History and Medications   Past Medical History:   Diagnosis Date    Arthritis     Asthma     Allergic    Bell's palsy     2013    Diabetes mellitus (HCC)     GERD (gastroesophageal reflux disease)     Hypercholesteremia     Hypertension     Migraines     Seasonal allergies      Past Surgical History:   Procedure Laterality Date    COSMETIC SURGERY      Face-birth pia removal    DILATION AND CURETTAGE OF UTERUS      EGD      KS LAPS GSTR RSTCV PX W/BYP BALJINDER-EN-Y LIMB <150 CM N/A 1/16/2023    Procedure: BYPASS GASTRIC  R-N-Y LAP , intra-op  EGD;  Surgeon: Ramos Andrade MD;  Location: [facility];  Service: Bariatrics    WISDOM TOOTH EXTRACTION       Social History     Socioeconomic History    Marital status:      Spouse name: Not on file    Number of children: Not on file    Years of education: Not on file    Highest education level: Not on file   Occupational History    Not on file   Tobacco Use    Smoking status: Never    Smokeless tobacco: Never   Vaping Use    Vaping status: Never Used   Substance and Sexual Activity    Alcohol use: Not Currently     Comment: socially/rare    Drug use: Never    Sexual activity: Not on file   Other Topics Concern    Not on file   Social History Narrative     Not on file     Social Drivers of Health     Financial Resource Strain: Not on file   Food Insecurity: Not on file   Transportation Needs: Not on file   Physical Activity: Not on file   Stress: Not on file   Social Connections: Not on file   Intimate Partner Violence: Not on file   Housing Stability: Not on file       Current Outpatient Medications:     acetaminophen (TYLENOL) 160 mg/5 mL liquid, , Disp: , Rfl:     albuterol (PROVENTIL HFA,VENTOLIN HFA) 90 mcg/act inhaler, TAKE 2 PUFFS BY MOUTH EVERY 6 HOURS AS NEEDED FOR WHEEZE (Patient not taking: Reported on 8/1/2024), Disp: , Rfl:     budesonide (Pulmicort Flexhaler) 90 MCG/ACT inhaler, Inhale 1 puff 2 (two) times a day Rinse mouth after use. (Patient not taking: Reported on 8/1/2024), Disp: , Rfl:     Calcium Citrate-Vitamin D 500-12.5 MG-MCG PACK, Take 1 tablet by mouth Three times a day, Disp: , Rfl:     fenofibrate micronized (LOFIBRA) 200 MG capsule, Take 200 mg by mouth daily (Patient not taking: Reported on 1/26/2023), Disp: , Rfl:     Multiple Vitamin (MULTIVITAMIN ADULT PO), Take 1 tablet by mouth daily, Disp: , Rfl:     omeprazole (PriLOSEC) 20 mg delayed release capsule, Take 1 capsule (20 mg total) by mouth daily (Patient not taking: Reported on 8/1/2024), Disp: 30 capsule, Rfl: 1    rizatriptan (MAXALT) 5 MG tablet, Take 5 mg by mouth once as needed for migraine May repeat in 2 hours if needed, Disp: , Rfl:     topiramate (TOPAMAX) 25 mg tablet, Take one to three pills by mouth in the evening as directed w plenty of water, Disp: , Rfl:     Food Intake and Lifestyle Assessment   Food Intake Assessment completed via [email or web address]--  Measuring more accurately.   5:30-6am gym 3-4 days per week  6am-Protein shake (Premier) at gym  B: 7:30-8am dannon light and fit greek + 1 tbsp scoop pro powder (if <20gm protein in the yogurt) + 1-2 slices of turkey dover OR oatmeal + tbsp protein powder  L:12pm- 2.6-3oz grilled chicken in salad sometimes with nely erick  2tbsp + cherry tomatoes  OR sliced apple + 1/4oz of kory cheese + 3 slices of turkey breast (2oz) + 2 slices of 40 selwyn bread  1pm-1/2-1 Protein shake with coffee (1/2 caf)  D:  5pm: 4 small meatballs w/tomatoes + small chunks of potatoes + a few grapes and protein pasta + chicken breast + toast OR chicken or meatballs (aims for 3oz) OR if does pasta will be high fiber or high protein pasta but just a couple OR 2 pieces of falafel +hummus + 1/2 becky + some   S:  8pm-skinny pop snack bag OR enlighten bar OR build protein puff in between julien cracker but not too often because too sweet or roasted chick peas     This Thanksgiving is going to make non-traditional foods--possible air fried empanadas and/or streeter's pie    Beverage intake: water  Diet texture/stage: regular  Protein supplement: one per day   Estimated protein intake per day:  grams per day pt log in baritastic   Estimated fluid intake per day: 64oz + 16oz at night + 1.5 protein shake   Meals eaten away from home: n/a  Typical meal pattern: 2-3 meals per day and 0 snacks per day  Eating Behaviors: Appropriate diet advancement, Appropriate portion sizes and Does not drink with meals and waits 30-minutes after meal before resuming drinking    Vitamin and Mineral regimen:   Baripal w/60mg iron  Not taking the calcium  1 vitron C every other day  Collagen pills     Food allergies or intolerances: some difficulty with salad- feels full quickly  Has some rice at a party that was uncomfortable     Cultural or Lutheran considerations: N/A    Lab Results   Component Value Date    HGBA1C 5.2 05/17/2024       Physical Assessment  Nutrition Related Findings  Loss of Hunger    Physical Activity  Types of exercise: gym--legs one day (machines) and arms (chest flies, pull downs, etc) another day + 20 mins cardio. Now doing a little more cardio (30-40mins) and increasing the speed has been a change.    Goes to gym several times per week--4 times per  week  Current physical limitations: none currently     Psychosocial Assessment   Support systems: son   Socioeconomic factors: works full time     Nutrition Diagnosis- improving since surgery   Diagnosis: Overweight / Obesity (NC-3.3)  Related to: Physical inactivity and Excessive energy intake  As Evidenced by: BMI >25     Nutrition Prescription: Recommend the following diet  1200 kcal per day / 75 grams protein   3 meals per day  1.5-2 protein shake per day   1-2 snacks    Interventions and Teaching   Patient educated on post-op nutrition guidelines.       Patient educated and handouts provided.  Capacity of post-surgery stomach  Diet progression  Adequate hydration  Expected weight loss  Exercise  Nutrition considerations after surgery  Protein supplements  Appropriate carbohydrate, protein, and fat intake, and food/fluid choices to maximize safe weight loss, nutrient intake, and tolerance   Dietary and lifestyle changes  Intuitive eating  Techniques for self monitoring and keeping daily food journal  Potential for food intolerance after surgery, and ways to deal with them including: lactose intolerance, nausea, reflux, vomiting, diarrhea, food intolerance, appetite changes, gas    Education provided to: patient    Barriers to learning: No barriers identified    Readiness to change: action    Comprehension: demonstrated understanding and verbalizes understanding     Expected Compliance: good    Evaluation/Monitoring   Eating pattern as discussed Tolerance of nutrition prescription Body weight Lab values Physical activity Bowel pattern    Pt presents almost 2 years s/p RYGB with excellent weight loss.  Pt has lost 3lbs in the past month.  She reports that she is measuring her foods more accurately and she increased her cardio from 20 mins to 30-40 mins when she is at the gym. Pt continues with focusing on protein, is eating three meals per day.  She admits to not logging as much, but most often will eat the same  foods for most meals. She does continue to measure and weight.      Goals  Food journal via [employer]  Eat 3 meals per day   Food log 1200 cals (with current exercise regimen), 75 grams of protein per day   Continue with gym 3-4 times per week   Suggested meal rx:  at gym-160cal protein shake, B-200-300 selwyn, 100 selwyn snack, 300 L, 100 selwyn snack and optional protien shake in afternoon with coffee, [id number]).  F/U 1 month    Time Spent:   30 Minutes

## 2025-01-27 NOTE — ASSESSMENT & PLAN NOTE
-At risk for malabsorption of vitamins/minerals secondary to malabsorption and restriction of intake from bariatric surgery  -Currently taking adequate postop bariatric surgery vitamin supplementation: Lonny Pal MVI with 65mg iron, calcium citrate 500mg TID, 1 Vitron C    -Last Labs 6 months ago - ferritin 19 - repeat now    -CBC/Metabolic panel due   -Patient received education about the importance of adhering to a lifelong supplementation regimen to avoid vitamin/mineral deficiencies

## 2025-01-27 NOTE — ASSESSMENT & PLAN NOTE
Lab Results   Component Value Date    HGBA1C 5.2 05/17/2024     -In remission on no medications; repeat A1C   -continue healthy diet and exercise

## 2025-01-27 NOTE — ASSESSMENT & PLAN NOTE
-Avoid fried foods and trans fat, limit saturated fats and refined carbohydrates  -Increase fish/omega 3 FA consumption  -Increase physical activity  -lipid panel in May WNL; repeat labs ordered

## 2025-01-27 NOTE — ASSESSMENT & PLAN NOTE
-s/p Gisselle-En-Y Gastric Bypass with Dr. Andrade on 01/16/23. Overall doing very well and maintaining her weight. She will continue with healthy diet and exercise and f/u with RD for additional support. F/u with me in 1 year    Initial: 248 lbs   Current: 160.8lbs  EWL: 72%  Reilly: 149.5lbs  Current BMI is Body mass index is 31.4 kg/m².    Tolerating a regular diet-yes  Eating at least 60 grams of protein per day-yes  Following 30/60 minute rule with liquids-yes  Drinking at least 64 ounces of fluid per day-yes  Drinking carbonated beverages-no  Sufficient exercise-yes  Using NSAIDs regularly-no  Using nicotine-no  Using alcohol-no. Advised about the risks of alcohol s/p bariatric surgery and recommend avoiding all alcohol

## 2025-02-02 LAB
25(OH)D3 SERPL-MCNC: 45 NG/ML (ref 30–100)
ALBUMIN SERPL-MCNC: 3.8 G/DL (ref 3.6–5.1)
ALBUMIN/GLOB SERPL: 1.5 (CALC) (ref 1–2.5)
ALP SERPL-CCNC: 65 U/L (ref 37–153)
ALT SERPL-CCNC: 23 U/L (ref 6–29)
AST SERPL-CCNC: 20 U/L (ref 10–35)
BASOPHILS # BLD AUTO: 50 CELLS/UL (ref 0–200)
BASOPHILS NFR BLD AUTO: 0.9 %
BILIRUB SERPL-MCNC: 0.5 MG/DL (ref 0.2–1.2)
BUN SERPL-MCNC: 20 MG/DL (ref 7–25)
BUN/CREAT SERPL: NORMAL (CALC) (ref 6–22)
CALCIUM SERPL-MCNC: 9 MG/DL (ref 8.6–10.4)
CALCIUM SERPL-MCNC: 9 MG/DL (ref 8.6–10.4)
CHLORIDE SERPL-SCNC: 106 MMOL/L (ref 98–110)
CHOLEST SERPL-MCNC: 174 MG/DL
CHOLEST/HDLC SERPL: 3.1 (CALC)
CO2 SERPL-SCNC: 28 MMOL/L (ref 20–32)
CREAT SERPL-MCNC: 0.6 MG/DL (ref 0.5–1.03)
EOSINOPHIL # BLD AUTO: 215 CELLS/UL (ref 15–500)
EOSINOPHIL NFR BLD AUTO: 3.9 %
ERYTHROCYTE [DISTWIDTH] IN BLOOD BY AUTOMATED COUNT: 11.5 % (ref 11–15)
FERRITIN SERPL-MCNC: 25 NG/ML (ref 16–232)
FOLATE SERPL-MCNC: 19.1 NG/ML
GFR/BSA.PRED SERPLBLD CYS-BASED-ARV: 109 ML/MIN/1.73M2
GLOBULIN SER CALC-MCNC: 2.5 G/DL (CALC) (ref 1.9–3.7)
GLUCOSE SERPL-MCNC: 86 MG/DL (ref 65–99)
HBA1C MFR BLD: 4.9 % OF TOTAL HGB
HCT VFR BLD AUTO: 41.2 % (ref 35–45)
HDLC SERPL-MCNC: 56 MG/DL
HGB BLD-MCNC: 13.4 G/DL (ref 11.7–15.5)
IRON SATN MFR SERPL: 29 % (CALC) (ref 16–45)
IRON SERPL-MCNC: 102 MCG/DL (ref 45–160)
LDLC SERPL CALC-MCNC: 98 MG/DL (CALC)
LYMPHOCYTES # BLD AUTO: 1221 CELLS/UL (ref 850–3900)
LYMPHOCYTES NFR BLD AUTO: 22.2 %
MCH RBC QN AUTO: 32 PG (ref 27–33)
MCHC RBC AUTO-ENTMCNC: 32.5 G/DL (ref 32–36)
MCV RBC AUTO: 98.3 FL (ref 80–100)
MONOCYTES # BLD AUTO: 374 CELLS/UL (ref 200–950)
MONOCYTES NFR BLD AUTO: 6.8 %
NEUTROPHILS # BLD AUTO: 3641 CELLS/UL (ref 1500–7800)
NEUTROPHILS NFR BLD AUTO: 66.2 %
NONHDLC SERPL-MCNC: 118 MG/DL (CALC)
PLATELET # BLD AUTO: 215 THOUSAND/UL (ref 140–400)
PMV BLD REES-ECKER: 11.2 FL (ref 7.5–12.5)
POTASSIUM SERPL-SCNC: 4 MMOL/L (ref 3.5–5.3)
PROT SERPL-MCNC: 6.3 G/DL (ref 6.1–8.1)
PTH-INTACT SERPL-MCNC: 55 PG/ML (ref 16–77)
RBC # BLD AUTO: 4.19 MILLION/UL (ref 3.8–5.1)
SODIUM SERPL-SCNC: 139 MMOL/L (ref 135–146)
TIBC SERPL-MCNC: 354 MCG/DL (CALC) (ref 250–450)
TRIGL SERPL-MCNC: 102 MG/DL
TSH SERPL-ACNC: 2.45 MIU/L
VIT B12 SERPL-MCNC: 947 PG/ML (ref 200–1100)
WBC # BLD AUTO: 5.5 THOUSAND/UL (ref 3.8–10.8)

## 2025-02-03 ENCOUNTER — RESULTS FOLLOW-UP (OUTPATIENT)
Dept: BARIATRICS | Facility: CLINIC | Age: 50
End: 2025-02-03

## 2025-02-03 NOTE — RESULT ENCOUNTER NOTE
Please let Winnie know about her labs, thank you:    -Ferritin (iron stores) improving but still mildly low - continue with extra oral iron as she has been taking (VITRON C or BLOOD BUILDER)

## 2025-02-05 ENCOUNTER — CLINICAL SUPPORT (OUTPATIENT)
Dept: BARIATRICS | Facility: CLINIC | Age: 50
End: 2025-02-05

## 2025-02-05 ENCOUNTER — OFFICE VISIT (OUTPATIENT)
Dept: BARIATRICS | Facility: CLINIC | Age: 50
End: 2025-02-05
Payer: COMMERCIAL

## 2025-02-05 VITALS — BODY MASS INDEX: 31.57 KG/M2 | HEIGHT: 60 IN | WEIGHT: 160.8 LBS

## 2025-02-05 VITALS
WEIGHT: 160.8 LBS | HEART RATE: 81 BPM | DIASTOLIC BLOOD PRESSURE: 70 MMHG | SYSTOLIC BLOOD PRESSURE: 110 MMHG | OXYGEN SATURATION: 98 % | HEIGHT: 60 IN | BODY MASS INDEX: 31.57 KG/M2

## 2025-02-05 DIAGNOSIS — K91.2 POSTSURGICAL MALABSORPTION: ICD-10-CM

## 2025-02-05 DIAGNOSIS — R79.0 LOW FERRITIN: ICD-10-CM

## 2025-02-05 DIAGNOSIS — K91.2 POSTSURGICAL MALABSORPTION: Primary | ICD-10-CM

## 2025-02-05 DIAGNOSIS — E11.69 TYPE 2 DIABETES MELLITUS WITH OBESITY  (HCC): ICD-10-CM

## 2025-02-05 DIAGNOSIS — E66.9 TYPE 2 DIABETES MELLITUS WITH OBESITY  (HCC): ICD-10-CM

## 2025-02-05 DIAGNOSIS — Z48.815 ENCOUNTER FOR SURGICAL AFTERCARE FOLLOWING SURGERY OF DIGESTIVE SYSTEM: Primary | ICD-10-CM

## 2025-02-05 DIAGNOSIS — E78.2 MIXED HYPERLIPIDEMIA: ICD-10-CM

## 2025-02-05 LAB
25(OH)D3 SERPL-MCNC: 45 NG/ML (ref 30–100)
ALBUMIN SERPL-MCNC: 3.8 G/DL (ref 3.6–5.1)
ALBUMIN/GLOB SERPL: 1.5 (CALC) (ref 1–2.5)
ALP SERPL-CCNC: 65 U/L (ref 37–153)
ALT SERPL-CCNC: 23 U/L (ref 6–29)
AST SERPL-CCNC: 20 U/L (ref 10–35)
BASOPHILS # BLD AUTO: 50 CELLS/UL (ref 0–200)
BASOPHILS NFR BLD AUTO: 0.9 %
BILIRUB SERPL-MCNC: 0.5 MG/DL (ref 0.2–1.2)
BUN SERPL-MCNC: 20 MG/DL (ref 7–25)
BUN/CREAT SERPL: NORMAL (CALC) (ref 6–22)
CALCIUM SERPL-MCNC: 9 MG/DL (ref 8.6–10.4)
CALCIUM SERPL-MCNC: 9 MG/DL (ref 8.6–10.4)
CHLORIDE SERPL-SCNC: 106 MMOL/L (ref 98–110)
CHOLEST SERPL-MCNC: 174 MG/DL
CHOLEST/HDLC SERPL: 3.1 (CALC)
CO2 SERPL-SCNC: 28 MMOL/L (ref 20–32)
CREAT SERPL-MCNC: 0.6 MG/DL (ref 0.5–1.03)
EOSINOPHIL # BLD AUTO: 215 CELLS/UL (ref 15–500)
EOSINOPHIL NFR BLD AUTO: 3.9 %
ERYTHROCYTE [DISTWIDTH] IN BLOOD BY AUTOMATED COUNT: 11.5 % (ref 11–15)
FERRITIN SERPL-MCNC: 25 NG/ML (ref 16–232)
FOLATE SERPL-MCNC: 19.1 NG/ML
GFR/BSA.PRED SERPLBLD CYS-BASED-ARV: 109 ML/MIN/1.73M2
GLOBULIN SER CALC-MCNC: 2.5 G/DL (CALC) (ref 1.9–3.7)
GLUCOSE SERPL-MCNC: 86 MG/DL (ref 65–99)
HBA1C MFR BLD: 4.9 % OF TOTAL HGB
HCT VFR BLD AUTO: 41.2 % (ref 35–45)
HDLC SERPL-MCNC: 56 MG/DL
HGB BLD-MCNC: 13.4 G/DL (ref 11.7–15.5)
IRON SATN MFR SERPL: 29 % (CALC) (ref 16–45)
IRON SERPL-MCNC: 102 MCG/DL (ref 45–160)
LDLC SERPL CALC-MCNC: 98 MG/DL (CALC)
LYMPHOCYTES # BLD AUTO: 1221 CELLS/UL (ref 850–3900)
LYMPHOCYTES NFR BLD AUTO: 22.2 %
MCH RBC QN AUTO: 32 PG (ref 27–33)
MCHC RBC AUTO-ENTMCNC: 32.5 G/DL (ref 32–36)
MCV RBC AUTO: 98.3 FL (ref 80–100)
MONOCYTES # BLD AUTO: 374 CELLS/UL (ref 200–950)
MONOCYTES NFR BLD AUTO: 6.8 %
NEUTROPHILS # BLD AUTO: 3641 CELLS/UL (ref 1500–7800)
NEUTROPHILS NFR BLD AUTO: 66.2 %
NONHDLC SERPL-MCNC: 118 MG/DL (CALC)
PLATELET # BLD AUTO: 215 THOUSAND/UL (ref 140–400)
PMV BLD REES-ECKER: 11.2 FL (ref 7.5–12.5)
POTASSIUM SERPL-SCNC: 4 MMOL/L (ref 3.5–5.3)
PROT SERPL-MCNC: 6.3 G/DL (ref 6.1–8.1)
PTH-INTACT SERPL-MCNC: 55 PG/ML (ref 16–77)
RBC # BLD AUTO: 4.19 MILLION/UL (ref 3.8–5.1)
SODIUM SERPL-SCNC: 139 MMOL/L (ref 135–146)
TIBC SERPL-MCNC: 354 MCG/DL (CALC) (ref 250–450)
TRIGL SERPL-MCNC: 102 MG/DL
TSH SERPL-ACNC: 2.45 MIU/L
VIT A SERPL-MCNC: 46 MCG/DL (ref 38–98)
VIT B1 BLD-SCNC: 140 NMOL/L (ref 78–185)
VIT B12 SERPL-MCNC: 947 PG/ML (ref 200–1100)
WBC # BLD AUTO: 5.5 THOUSAND/UL (ref 3.8–10.8)
ZINC SERPL-MCNC: 63 MCG/DL (ref 60–130)

## 2025-02-05 PROCEDURE — WEIGHT

## 2025-02-05 PROCEDURE — RECHECK

## 2025-02-05 PROCEDURE — 99214 OFFICE O/P EST MOD 30 MIN: CPT | Performed by: PHYSICIAN ASSISTANT

## 2025-02-05 RX ORDER — METOPROLOL SUCCINATE 25 MG/1
25 TABLET, EXTENDED RELEASE ORAL DAILY
COMMUNITY
Start: 2024-08-28 | End: 2025-08-28

## 2025-02-05 NOTE — PROGRESS NOTES
Bariatric Follow Up Nutrition Note    Type of surgery  Gastric bypass: laparoscopic  2 years post-op  Surgeon: Dr. Ramos Andrade    Nutrition Assessment   Winnie Matute  49 y.o.  female       Wt Readings from Last 3 Encounters:   02/05/25 72.9 kg (160 lb 12.8 oz)   02/05/25 72.9 kg (160 lb 12.8 oz)   11/22/24 70.4 kg (155 lb 3.2 oz)     Estimated caloric intake at 1.5 year post op with ~ 3 days per week of exercise (1# per week):  1200 cals, maintenance:  0913-2546 cals  Estimated protein needs at least 75gm protein  Estimated fluid needs 64 ounces (35 ml/kg IBW ) on average    Weight on Day of Weight Loss Surgery: 224#  Weight in (lb) to have BMI = 25: 127.3 lbs   Post-Op Wt Loss: 88#/ 72% EBWL in 12 years    Review of History and Medications   Past Medical History:   Diagnosis Date    Arthritis     Asthma     Allergic    Bell's palsy     2013    Diabetes mellitus (HCC)     GERD (gastroesophageal reflux disease)     Hypercholesteremia     Hypertension     Migraines     Seasonal allergies      Past Surgical History:   Procedure Laterality Date    COSMETIC SURGERY      Face-birth pia removal    DILATION AND CURETTAGE OF UTERUS      EGD      ID LAPS GSTR RSTCV PX W/BYP BALJINDER-EN-Y LIMB <150 CM N/A 1/16/2023    Procedure: BYPASS GASTRIC  R-N-Y LAP , intra-op  EGD;  Surgeon: Ramos Andrade MD;  Location: [facility];  Service: Bariatrics    WISDOM TOOTH EXTRACTION       Social History     Socioeconomic History    Marital status:      Spouse name: Not on file    Number of children: Not on file    Years of education: Not on file    Highest education level: Not on file   Occupational History    Not on file   Tobacco Use    Smoking status: Never    Smokeless tobacco: Never   Vaping Use    Vaping status: Never Used   Substance and Sexual Activity    Alcohol use: Not Currently     Comment: socially/rare    Drug use: Never    Sexual activity: Not on file   Other Topics Concern    Not on file   Social History Narrative     Not on file     Social Drivers of Health     Financial Resource Strain: Not on file   Food Insecurity: Not on file   Transportation Needs: Not on file   Physical Activity: Not on file   Stress: Not on file   Social Connections: Not on file   Intimate Partner Violence: Not on file   Housing Stability: Not on file       Current Outpatient Medications:     acetaminophen (TYLENOL) 160 mg/5 mL liquid, , Disp: , Rfl:     albuterol (PROVENTIL HFA,VENTOLIN HFA) 90 mcg/act inhaler, TAKE 2 PUFFS BY MOUTH EVERY 6 HOURS AS NEEDED FOR WHEEZE (Patient not taking: Reported on 8/1/2024), Disp: , Rfl:     budesonide (Pulmicort Flexhaler) 90 MCG/ACT inhaler, Inhale 1 puff 2 (two) times a day Rinse mouth after use. (Patient not taking: Reported on 8/1/2024), Disp: , Rfl:     Calcium Citrate-Vitamin D 500-12.5 MG-MCG PACK, Take 1 tablet by mouth Three times a day (Patient not taking: Reported on 2/5/2025), Disp: , Rfl:     metoprolol succinate (TOPROL-XL) 25 mg 24 hr tablet, Take 25 mg by mouth daily, Disp: , Rfl:     Multiple Vitamin (MULTIVITAMIN ADULT PO), Take 1 tablet by mouth daily, Disp: , Rfl:     omeprazole (PriLOSEC) 20 mg delayed release capsule, Take 1 capsule (20 mg total) by mouth daily (Patient not taking: Reported on 2/5/2025), Disp: 30 capsule, Rfl: 1    rizatriptan (MAXALT) 5 MG tablet, Take 5 mg by mouth once as needed for migraine May repeat in 2 hours if needed, Disp: , Rfl:     topiramate (TOPAMAX) 25 mg tablet, Take one to three pills by mouth in the evening as directed w plenty of water, Disp: , Rfl:     Food Intake and Lifestyle Assessment   Food Intake Assessment completed via usual recall   Continues to measure  5:30-6am gym 3-4 days per week  6am-([employer])-Protein shake (Premier) at gym  B: 7:30-8am oiko pro (20gm) + fruit + 1-2 cracker cuts of cheese and sometimes 1 slice of turkey dover OR dannon light and fit greek + 1 tbsp scoop pro powder (if <20gm protein in the yogurt) OR no sugar oatmeal +  protein powder   L:12pm- 2.6-3oz grilled chicken in salad (lettuce/cabbage/kale) + toasted 2tbsp almonds + unsweet coconut sometimes with nely erick 2tbsp/pineapple/canned manderian + cherry tomatoes    S: 1/2 protein shake + 1/2 caf coffee  D:  5pm: 4 small meatballs w/tomatoes + small chunks of potatoes + a few grapes and protein pasta + chicken breast + toast OR chicken or meatballs (aims for 3oz) OR kielbasa + 1-2 perogies OR 2 pieces of falafel +hummus + 1/2 becky + some   S:  8pm-Quest cheese its  selwyn pack of pretzels OR skinny pop snack bag then a sweet ie: quest protein muffin OR skinny cow --doesn't eat every night (encouraged to log a few days to ensure these calories aren't going over her suggested calorie range of the day)    Beverage intake: water  Diet texture/stage: regular  Protein supplement: one per day   Estimated protein intake per day:  grams per day pt log in baritastic   Estimated fluid intake per day: 64oz + 16oz at night + 1.5 protein shake   Meals eaten away from home: n/a  Typical meal pattern: 2-3 meals per day and 0 snacks per day  Eating Behaviors: Appropriate diet advancement, Appropriate portion sizes and Does not drink with meals and waits 30-minutes after meal before resuming drinking    Vitamin and Mineral regimen:   Baripal w/60mg iron  Not taking the calcium  1 vitron C every other day--looking to change to a different brand as feels the vitron C get stuck. Suggested cap or chewable from a bariatric company or PA-c suggested blood bulider or slow fe   Collagen pills     Food allergies or intolerances: some difficulty with salad- feels full quickly  Has some rice at a party that was uncomfortable     Cultural or Episcopal considerations: N/A    Lab Results   Component Value Date    HGBA1C 4.9 01/31/2025       Physical Assessment  Nutrition Related Findings  Loss of Hunger    Physical Activity  Types of exercise: gym--legs one day (machines) and arms (chest flies, pull  downs, etc) another day + 20 -25 mins cardio.   Goes to gym several times per week--4 times per week  Current physical limitations: none currently     Psychosocial Assessment   Support systems: son   Socioeconomic factors: works full time     Nutrition Diagnosis- improving since surgery   Diagnosis: Overweight / Obesity (NC-3.3)  Related to: Physical inactivity and Excessive energy intake  As Evidenced by: BMI >25     Nutrition Prescription: Recommend the following diet  2106-5775 kcal per day / 75 grams protein   3 meals per day  1.5-2 protein shake per day   1-2 snacks    Interventions and Teaching   Patient educated on post-op nutrition guidelines.       Patient educated and handouts provided.  Capacity of post-surgery stomach  Diet progression  Adequate hydration  Expected weight loss  Exercise  Nutrition considerations after surgery  Protein supplements  Appropriate carbohydrate, protein, and fat intake, and food/fluid choices to maximize safe weight loss, nutrient intake, and tolerance   Dietary and lifestyle changes  Intuitive eating  Techniques for self monitoring and keeping daily food journal  Potential for food intolerance after surgery, and ways to deal with them including: lactose intolerance, nausea, reflux, vomiting, diarrhea, food intolerance, appetite changes, gas    Education provided to: patient    Barriers to learning: No barriers identified    Readiness to change: action    Comprehension: demonstrated understanding and verbalizes understanding     Expected Compliance: good    Evaluation/Monitoring   Eating pattern as discussed Tolerance of nutrition prescription Body weight Lab values Physical activity Bowel pattern    Pt presents almost 2 years s/p RYGB with excellent weight loss.  She is up about 5# and overall has gained 10# from her anurag, but would be acceptable if maintained at this weight. Pt appears to be eating similar things each day.  She is focusing on protein, measuring her foods  but has got out of logging her foods. Suggested to log a few days per week to see where her calories are at and ensure night snack isn't going over her calories.      Completed a body comp today and reviewed results with pt. Noted her muscle mass is in the upper normal range which is great. Suggested to repeat in 3 months. She reports this will be a good comparison as she will be changing her workout routine soon.      Goals  Food journal via [employer]  Eat 3 meals per day   Food log 7828-1736 cals (with current exercise regimen), 75 grams of protein per day   Continue with gym 3-4 times per week   Try alternate iron supplement  F/U in 3 months and complete another body comp    Time Spent:   30 Minutes

## 2025-02-05 NOTE — PROGRESS NOTES
Assessment/Plan:    Encounter for surgical aftercare following surgery of digestive system  -s/p Gisselle-En-Y Gastric Bypass with Dr. Andrade on 01/16/23. Overall doing very well and maintaining her weight. She will continue with healthy diet and exercise and f/u with RD for additional support. F/u with me in 1 year    Initial: 248 lbs   Current: 160.8lbs  EWL: 72%  Reilly: 149.5lbs  Current BMI is Body mass index is 31.4 kg/m².    Tolerating a regular diet-yes  Eating at least 60 grams of protein per day-yes  Following 30/60 minute rule with liquids-yes  Drinking at least 64 ounces of fluid per day-yes  Drinking carbonated beverages-no  Sufficient exercise-yes  Using NSAIDs regularly-no  Using nicotine-no  Using alcohol-no. Advised about the risks of alcohol s/p bariatric surgery and recommend avoiding all alcohol      Postsurgical malabsorption  -At risk for malabsorption of vitamins/minerals secondary to malabsorption and restriction of intake from bariatric surgery  -Currently taking adequate postop bariatric surgery vitamin supplementation: Lonny Pal MVI with 65mg iron, calcium citrate 500mg TID, 1 Vitron C    -Last Labs 6 months ago - ferritin 19 - repeat now    -CBC/Metabolic panel due   -Patient received education about the importance of adhering to a lifelong supplementation regimen to avoid vitamin/mineral deficiencies       Mixed hyperlipidemia  -Avoid fried foods and trans fat, limit saturated fats and refined carbohydrates  -Increase fish/omega 3 FA consumption  -Increase physical activity  -lipid panel in May WNL; repeat labs ordered    Type 2 diabetes mellitus with obesity  (HCC)    Lab Results   Component Value Date    HGBA1C 4.9 01/31/2025   -In remission on no medications; repeat A1C   -continue healthy diet and exercise      Diagnoses and all orders for this visit:    Encounter for surgical aftercare following surgery of digestive system    Postsurgical malabsorption  -     Iron Panel (Includes  Ferritin, Iron Sat%, Iron, and TIBC); Future  -     CBC and differential; Future    Mixed hyperlipidemia    Low ferritin  -     Iron Panel (Includes Ferritin, Iron Sat%, Iron, and TIBC); Future  -     CBC and differential; Future    Type 2 diabetes mellitus with obesity  (HCC)    Other orders  -     metoprolol succinate (TOPROL-XL) 25 mg 24 hr tablet; Take 25 mg by mouth daily          Subjective:      Patient ID: Winnie Matute is a 49 y.o. female.    -s/p Gisselle-En-Y Gastric Bypass with Dr. Andrade on 01/16/23.  Presents to the office today for routine follow up. Tolerating diet without issues; denies N/V, dysphagia, reflux. Overall doing well; maintaining her weight around 155-160lbs.  She is following 1200kcal diet and goes to gym.    Has intermittent rashes on her neck and behind her ear.    Works for the [employer] - mostly remotely. Has 3 grown kids.     Diet Recall:   B - protein shake  Snack - Greek yogurt and few berries  L - 3 oz chicken and mixed greens salad and mandarin pieces and almonds  D - meatballs and veggies and taco bowl and salad    Fluids - 1 coffee 1/2 caf, 64oz water    The following portions of the patient's history were reviewed and updated as appropriate: allergies, current medications, past family history, past medical history, past social history, past surgical history and problem list.    Review of Systems   Constitutional:  Negative for chills and fever. Unexpected weight change: planned weight loss.  HENT:  Negative for trouble swallowing.    Respiratory:  Negative for cough and shortness of breath.    Cardiovascular:  Negative for chest pain and palpitations.   Gastrointestinal:  Negative for abdominal pain, constipation, diarrhea, nausea and vomiting.   Skin:  Positive for rash.   Neurological:  Negative for dizziness.   Psychiatric/Behavioral:          Denies anxiety and depression         Objective:      /70 (BP Location: Left arm, Patient Position: Sitting, Cuff Size: Standard)    Pulse 81   Ht 5' (1.524 m)   Wt 72.9 kg (160 lb 12.8 oz)   LMP 01/03/2023 (Exact Date)   SpO2 98%   BMI 31.40 kg/m²     Colonoscopy-Completed       Physical Exam  Vitals reviewed.   Constitutional:       General: She is not in acute distress.     Appearance: She is well-developed.   HENT:      Head: Normocephalic and atraumatic.   Eyes:      General: No scleral icterus.  Cardiovascular:      Rate and Rhythm: Normal rate and regular rhythm.   Pulmonary:      Effort: Pulmonary effort is normal. No respiratory distress.   Abdominal:      General: There is no distension.      Palpations: Abdomen is soft.   Skin:     General: Skin is warm and dry.   Neurological:      Mental Status: She is alert.   Psychiatric:         Mood and Affect: Mood normal.         Behavior: Behavior normal.           BARRIERS: none identified    GOALS:   Continued/Maintain healthy weight loss with good nutrition intakes.  Adequate hydration with at least 64oz. fluid intake.  Normal vitamin and mineral levels.  Exercise as tolerated.  Blood Builder Builder, Flow Fe, or Vitron C - take either one daily for extra iron    Follow-up in 1 year. We kindly ask that your arrive 15 minutes before your scheduled appointment time with your provider to allow our staff to room you, get your vital signs and update your chart.    Follow diet as discussed.      Get lab work done in the next 2 weeks.  You have been given a lab slip today.  Please call the office if you need a replacement.  It is recommended to check with your insurance BEFORE getting labs done to make sure they are covered by your policy.  Also, please check with your PCP and other providers before getting labs to avoid duplicate labs. Make sure to HOLD any multivitamins that may contain biotin and any biotin supplements FOR 5 DAYS before any labs since it can affect the results.    Follow vitamin and mineral recommendations as reviewed with you.    Call our office if you have any problems  with abdominal pain especially associated with fever, chills, nausea, vomiting or any other concerns.    All  Post-bariatric surgery patients should be aware that very small quantities of any alcohol can cause impairment and it is very possible not to feel the effect. The effect can be in the system for several hours.  It is also a stomach irritant.     It is advised to AVOID alcohol, Nonsteroidal antiinflammatory drugs (NSAIDS) and nicotine of all forms . Any of these can cause stomach irritation/pain.

## 2025-02-05 NOTE — PATIENT INSTRUCTIONS
GOALS:   Continued/Maintain healthy weight loss with good nutrition intakes.  Adequate hydration with at least 64oz. fluid intake.  Normal vitamin and mineral levels.  Exercise as tolerated.  Blood Builder Builder, Flow Fe, or Vitron C - take either one daily for extra iron    Follow-up in 1 year. We kindly ask that your arrive 15 minutes before your scheduled appointment time with your provider to allow our staff to room you, get your vital signs and update your chart.    Follow diet as discussed.      Get lab work done in the next 2 weeks.  You have been given a lab slip today.  Please call the office if you need a replacement.  It is recommended to check with your insurance BEFORE getting labs done to make sure they are covered by your policy.  Also, please check with your PCP and other providers before getting labs to avoid duplicate labs. Make sure to HOLD any multivitamins that may contain biotin and any biotin supplements FOR 5 DAYS before any labs since it can affect the results.    Follow vitamin and mineral recommendations as reviewed with you.    Call our office if you have any problems with abdominal pain especially associated with fever, chills, nausea, vomiting or any other concerns.    All  Post-bariatric surgery patients should be aware that very small quantities of any alcohol can cause impairment and it is very possible not to feel the effect. The effect can be in the system for several hours.  It is also a stomach irritant.     It is advised to AVOID alcohol, Nonsteroidal antiinflammatory drugs (NSAIDS) and nicotine of all forms . Any of these can cause stomach irritation/pain.

## 2025-02-05 NOTE — ASSESSMENT & PLAN NOTE
Lab Results   Component Value Date    HGBA1C 4.9 01/31/2025   -In remission on no medications; repeat A1C   -continue healthy diet and exercise

## 2025-04-17 ENCOUNTER — CLINICAL SUPPORT (OUTPATIENT)
Dept: BARIATRICS | Facility: CLINIC | Age: 50
End: 2025-04-17

## 2025-04-17 VITALS — BODY MASS INDEX: 31.65 KG/M2 | WEIGHT: 161.2 LBS | HEIGHT: 60 IN

## 2025-04-17 DIAGNOSIS — K91.2 POSTSURGICAL MALABSORPTION: Primary | ICD-10-CM

## 2025-04-17 PROCEDURE — WEIGHT

## 2025-04-17 PROCEDURE — RECHECK

## 2025-04-17 NOTE — PROGRESS NOTES
Body composition completed utilizing SECA scale and results reviewed with patient.  Noted -3# muscle mass, +3# fat mass, therefore the weight is the same.     Yesterday logged about 1200cals but remembered didn't log her snack at night which she forgets to log often, 117gm protein   Fruit + yogurt + 2 cracker cut cheese  Cowboy casserole (300#) +cheese   Protein plus pasta + grilled chicken + vodka sauce + garlic knot  1.5 protein shake  60oz water +1/2 caf coffee 6oz coffee + 6 oz protein shake   Still goes to the gym at 5am

## (undated) DEVICE — 2000CC GUARDIAN II: Brand: GUARDIAN

## (undated) DEVICE — 10 MM BABCOCKS WITH RATCHET HANDLES: Brand: ENDOPATH

## (undated) DEVICE — TUBING SUCTION 5MM X 12 FT

## (undated) DEVICE — CHLORAPREP HI-LITE 26ML ORANGE

## (undated) DEVICE — ELECTRODE LAP SPATULA STR E-Z CLEAN 33CM -0018

## (undated) DEVICE — ALLENTOWN LAP CHOLE APP PACK: Brand: CARDINAL HEALTH

## (undated) DEVICE — ENDOPATH 5MM CURVED SCISSORS WITH MONOPOLAR CAUTERY: Brand: ENDOPATH

## (undated) DEVICE — TROCAR: Brand: KII FIOS FIRST ENTRY

## (undated) DEVICE — TROCAR VISIPORT

## (undated) DEVICE — WEBRIL 6 IN UNSTERILE

## (undated) DEVICE — ADHESIVE SKIN CLSR DERMABOND NX

## (undated) DEVICE — VISUALIZATION SYSTEM: Brand: CLEARIFY

## (undated) DEVICE — ASTOUND STANDARD SURGICAL GOWN, XL: Brand: CONVERTORS

## (undated) DEVICE — 3000CC GUARDIAN II: Brand: GUARDIAN

## (undated) DEVICE — ENDO STITCH 2-0 SURGIDAC 48 IN

## (undated) DEVICE — POWER SHELL SIGNIA

## (undated) DEVICE — ENDO STITCH DEVICE 10 MM

## (undated) DEVICE — DRAPE EQUIPMENT RF WAND

## (undated) DEVICE — TROCARS: Brand: KII® BALLOON BLUNT TIP SYSTEM

## (undated) DEVICE — GLOVE PI ULTRA TOUCH SZ.7.0

## (undated) DEVICE — INTRODUCER ANAL ABDOM EEA25 DISP STRL

## (undated) DEVICE — SUT MONOCRYL 4-0 PS-2 27 IN Y426H

## (undated) DEVICE — TUBING SMOKE EVAC W/FILTRATION DEVICE PLUMEPORT ACTIV

## (undated) DEVICE — BAG DECANTER

## (undated) DEVICE — VIOLET BRAIDED (POLYGLACTIN 910), SYNTHETIC ABSORBABLE SUTURE: Brand: COATED VICRYL

## (undated) DEVICE — SYRINGE 30ML LL

## (undated) DEVICE — COVIDIEN ENDO GIA PURPLE (MED) RELOAD 60MM

## (undated) DEVICE — PMI DISPOSABLE PUNCTURE CLOSURE DEVICE / SUTURE GRASPER: Brand: PMI

## (undated) DEVICE — SCD SEQUENTIAL COMPRESSION COMFORT SLEEVE MEDIUM KNEE LENGTH: Brand: KENDALL SCD

## (undated) DEVICE — TIBURON LAPAROSCOPIC ABDOMINAL DRAPE: Brand: CONVERTORS

## (undated) DEVICE — TISSUE RETRIEVAL SYSTEM: Brand: INZII RETRIEVAL SYSTEM

## (undated) DEVICE — NEEDLE SPINAL18G X 3.5 IN QUINCKE

## (undated) DEVICE — INTENDED FOR TISSUE SEPARATION, AND OTHER PROCEDURES THAT REQUIRE A SHARP SURGICAL BLADE TO PUNCTURE OR CUT.: Brand: BARD-PARKER SAFETY BLADES SIZE 15, STERILE

## (undated) DEVICE — URETERAL CATHETER ADAPTOR TIP

## (undated) DEVICE — [HIGH FLOW INSUFFLATOR,  DO NOT USE IF PACKAGE IS DAMAGED,  KEEP DRY,  KEEP AWAY FROM SUNLIGHT,  PROTECT FROM HEAT AND RADIOACTIVE SOURCES.]: Brand: PNEUMOSURE

## (undated) DEVICE — ENDO STITCH 2-0 VICRYL

## (undated) DEVICE — LAPAROSCOPIC TROCAR SLEEVE/SINGLE USE: Brand: KII® SLEEVE

## (undated) DEVICE — PLUMEPEN PRO 10FT

## (undated) DEVICE — SYRINGE 20ML LL

## (undated) DEVICE — GLOVE INDICATOR PI UNDERGLOVE SZ 7 BLUE

## (undated) DEVICE — IRRIG ENDO FLO TUBING

## (undated) DEVICE — TRAVELKIT CONTAINS FIRST STEP KIT (200ML EP-4 KIT) AND SOILED SCOPE BAG - 1 KIT: Brand: TRAVELKIT CONTAINS FIRST STEP KIT AND SOILED SCOPE BAG

## (undated) DEVICE — HARMONIC 1100 SHEARS, 36CM SHAFT LENGTH: Brand: HARMONIC